# Patient Record
Sex: FEMALE | Race: WHITE | Employment: FULL TIME | URBAN - METROPOLITAN AREA
[De-identification: names, ages, dates, MRNs, and addresses within clinical notes are randomized per-mention and may not be internally consistent; named-entity substitution may affect disease eponyms.]

---

## 2017-01-03 DIAGNOSIS — D64.9 ANEMIA, UNSPECIFIED: Primary | ICD-10-CM

## 2017-01-03 DIAGNOSIS — E66.09 OBESITY DUE TO EXCESS CALORIES, UNSPECIFIED OBESITY SEVERITY: ICD-10-CM

## 2017-01-03 DIAGNOSIS — E78.00 PURE HYPERCHOLESTEROLEMIA: ICD-10-CM

## 2017-01-03 DIAGNOSIS — I10 ESSENTIAL HYPERTENSION, BENIGN: ICD-10-CM

## 2017-01-04 LAB
ABSOLUTE BASOPHILS: 28 CELLS/UL (ref 0–200)
ABSOLUTE EOSINOPHILS: 270 CELLS/UL (ref 15–500)
ABSOLUTE LYMPHOCYTES: 1767 CELLS/UL (ref 850–3900)
ABSOLUTE MONOCYTES: 605 CELLS/UL (ref 200–950)
ABSOLUTE NEUTROPHILS: 6631 CELLS/UL (ref 1500–7800)
ALBUMIN/GLOBULIN RATIO: 1.5 (CALC) (ref 1–2.5)
ALBUMIN: 4.1 G/DL (ref 3.6–5.1)
ALKALINE PHOSPHATASE: 85 U/L (ref 33–130)
ALT: 31 U/L (ref 6–29)
AST: 24 U/L (ref 10–35)
BASOPHILS: 0.3 %
BILIRUBIN, TOTAL: 0.3 MG/DL (ref 0.2–1.2)
BUN: 15 MG/DL (ref 7–25)
CALCIUM: 9.6 MG/DL (ref 8.6–10.4)
CARBON DIOXIDE: 28 MMOL/L (ref 20–31)
CHLORIDE: 104 MMOL/L (ref 98–110)
CHOL/HDLC RATIO: 3.4 (CALC)
CHOLESTEROL, TOTAL: 199 MG/DL (ref 125–200)
CREATININE: 0.72 MG/DL (ref 0.5–1.05)
EGFR IF AFRICN AM: 112 ML/MIN/1.73M2
EGFR IF NONAFRICN AM: 96 ML/MIN/1.73M2
EOSINOPHILS: 2.9 %
GLOBULIN: 2.8 G/DL (CALC) (ref 1.9–3.7)
GLUCOSE: 103 MG/DL (ref 65–99)
HDL CHOLESTEROL: 59 MG/DL
HEMATOCRIT: 37.9 % (ref 35–45)
HEMOGLOBIN A1C: 5.9 % OF TOTAL HGB
HEMOGLOBIN: 12.6 G/DL (ref 11.7–15.5)
LDL-CHOLESTEROL: 115 MG/DL (CALC)
LYMPHOCYTES: 19 %
MCH: 28.8 PG (ref 27–33)
MCHC: 33.3 G/DL (ref 32–36)
MCV: 86.4 FL (ref 80–100)
MONOCYTES: 6.5 %
MPV: 8.3 FL (ref 7.5–11.5)
NEUTROPHILS: 71.3 %
NON-HDL CHOLESTEROL: 140 MG/DL (CALC)
PLATELET COUNT: 334 THOUSAND/UL (ref 140–400)
POTASSIUM: 4.6 MMOL/L (ref 3.5–5.3)
PROTEIN, TOTAL: 6.9 G/DL (ref 6.1–8.1)
RDW: 14.3 % (ref 11–15)
RED BLOOD CELL COUNT: 4.39 MILLION/UL (ref 3.8–5.1)
SODIUM: 139 MMOL/L (ref 135–146)
TRIGLYCERIDES: 126 MG/DL
TSH: 2.47 MIU/L
VITAMIN D, 25-OH, TOTAL: 23 NG/ML (ref 30–100)
WHITE BLOOD CELL COUNT: 9.3 THOUSAND/UL (ref 3.8–10.8)

## 2017-01-06 RX ORDER — ERGOCALCIFEROL (VITAMIN D2) 1250 MCG
50000 CAPSULE ORAL WEEKLY
Qty: 24 CAPSULE | Refills: 0 | Status: SHIPPED | OUTPATIENT
Start: 2017-01-06 | End: 2017-02-05

## 2017-02-16 RX ORDER — ESCITALOPRAM OXALATE 20 MG/1
TABLET ORAL
Qty: 90 TABLET | Refills: 0 | Status: SHIPPED | OUTPATIENT
Start: 2017-02-16 | End: 2017-03-30

## 2017-02-24 ENCOUNTER — OFFICE VISIT (OUTPATIENT)
Dept: INTERNAL MEDICINE CLINIC | Facility: CLINIC | Age: 52
End: 2017-02-24

## 2017-02-24 VITALS
DIASTOLIC BLOOD PRESSURE: 78 MMHG | SYSTOLIC BLOOD PRESSURE: 120 MMHG | WEIGHT: 257.25 LBS | HEART RATE: 68 BPM | BODY MASS INDEX: 43 KG/M2 | OXYGEN SATURATION: 97 % | TEMPERATURE: 99 F

## 2017-02-24 DIAGNOSIS — J06.9 ACUTE UPPER RESPIRATORY INFECTION: Primary | ICD-10-CM

## 2017-02-24 PROCEDURE — 99213 OFFICE O/P EST LOW 20 MIN: CPT | Performed by: INTERNAL MEDICINE

## 2017-02-24 RX ORDER — AMOXICILLIN AND CLAVULANATE POTASSIUM 875; 125 MG/1; MG/1
1 TABLET, FILM COATED ORAL 2 TIMES DAILY
Qty: 14 TABLET | Refills: 0 | Status: SHIPPED | OUTPATIENT
Start: 2017-02-24 | End: 2017-03-03

## 2017-02-24 RX ORDER — FLUTICASONE PROPIONATE 50 MCG
2 SPRAY, SUSPENSION (ML) NASAL DAILY
Qty: 1 BOTTLE | Refills: 3 | Status: SHIPPED | OUTPATIENT
Start: 2017-02-24 | End: 2018-02-19

## 2017-02-24 NOTE — PATIENT INSTRUCTIONS
For your sinus infection:  - We will do a course of antibiotics (Augmentin). Take 1 tablet twice daily with meals for the next week. Take over the counter probiotics or yogurt while on the antibiotic.   The main side effect to look out for is GI upset/cayla

## 2017-02-24 NOTE — PROGRESS NOTES
Naga Marino is a 45year old female. HPI:   Patient presents with:  URI: over 1 week. The last couple days with sore throat. Green mucus. Patient presents with acute upper respiratory symptoms.   She has been having a sore throat, congestion, before breakfast., Disp: 90 tablet, Rfl: 0  •  ferrous sulfate 325 (65 FE) MG Oral Tab EC, Take 325 mg by mouth daily with breakfast., Disp: , Rfl:     Medical:  has a past medical history of Lipid screening (9/14/2011);  Closed fracture of unspecified part sputum. Augmentin 875 BID x 7 days prescribed, as well as fluticasone nasal spray BID. Advised to call with worsening symptoms.        Patient Care Team:  Bambi Gomez MD as PCP - [facility])  The patient indicates understanding of these i

## 2017-03-30 ENCOUNTER — TELEPHONE (OUTPATIENT)
Dept: INTERNAL MEDICINE CLINIC | Facility: CLINIC | Age: 52
End: 2017-03-30

## 2017-03-30 RX ORDER — ESCITALOPRAM OXALATE 20 MG/1
20 TABLET ORAL
Qty: 90 TABLET | Refills: 0 | Status: SHIPPED | OUTPATIENT
Start: 2017-03-30 | End: 2017-07-05

## 2017-03-30 RX ORDER — ATORVASTATIN CALCIUM 10 MG/1
TABLET, FILM COATED ORAL
Qty: 90 TABLET | Refills: 0 | Status: SHIPPED | OUTPATIENT
Start: 2017-03-30 | End: 2017-07-05

## 2017-03-30 RX ORDER — METOPROLOL TARTRATE 50 MG/1
50 TABLET, FILM COATED ORAL 2 TIMES DAILY
Qty: 180 TABLET | Refills: 0 | Status: SHIPPED | OUTPATIENT
Start: 2017-03-30 | End: 2017-07-05

## 2017-06-16 DIAGNOSIS — R89.9 ABNORMAL LABORATORY TEST: Primary | ICD-10-CM

## 2017-07-05 ENCOUNTER — LAB ENCOUNTER (OUTPATIENT)
Dept: LAB | Age: 52
End: 2017-07-05
Attending: FAMILY MEDICINE
Payer: COMMERCIAL

## 2017-07-05 ENCOUNTER — OFFICE VISIT (OUTPATIENT)
Dept: INTERNAL MEDICINE CLINIC | Facility: CLINIC | Age: 52
End: 2017-07-05

## 2017-07-05 VITALS
BODY MASS INDEX: 42.65 KG/M2 | HEIGHT: 65 IN | WEIGHT: 256 LBS | HEART RATE: 66 BPM | TEMPERATURE: 98 F | SYSTOLIC BLOOD PRESSURE: 126 MMHG | DIASTOLIC BLOOD PRESSURE: 78 MMHG | OXYGEN SATURATION: 96 % | RESPIRATION RATE: 16 BRPM

## 2017-07-05 DIAGNOSIS — F41.9 ANXIETY: ICD-10-CM

## 2017-07-05 DIAGNOSIS — M25.571 CHRONIC PAIN OF RIGHT ANKLE: ICD-10-CM

## 2017-07-05 DIAGNOSIS — D64.9 ANEMIA, UNSPECIFIED: ICD-10-CM

## 2017-07-05 DIAGNOSIS — E78.00 PURE HYPERCHOLESTEROLEMIA: ICD-10-CM

## 2017-07-05 DIAGNOSIS — E55.9 VITAMIN D DEFICIENCY: ICD-10-CM

## 2017-07-05 DIAGNOSIS — I10 ESSENTIAL HYPERTENSION, BENIGN: Primary | ICD-10-CM

## 2017-07-05 DIAGNOSIS — G89.29 CHRONIC PAIN OF RIGHT ANKLE: ICD-10-CM

## 2017-07-05 DIAGNOSIS — F34.1 DYSTHYMIA: ICD-10-CM

## 2017-07-05 DIAGNOSIS — R89.9 ABNORMAL LABORATORY TEST: ICD-10-CM

## 2017-07-05 LAB
25-HYDROXYVITAMIN D (TOTAL): 42.8 NG/ML (ref 30–100)
ALBUMIN SERPL-MCNC: 3.5 G/DL (ref 3.5–4.8)
ALP LIVER SERPL-CCNC: 98 U/L (ref 41–108)
ALT SERPL-CCNC: 42 U/L (ref 14–54)
AST SERPL-CCNC: 39 U/L (ref 15–41)
BASOPHILS # BLD AUTO: 0.04 X10(3) UL (ref 0–0.1)
BASOPHILS NFR BLD AUTO: 0.5 %
BILIRUB SERPL-MCNC: 0.5 MG/DL (ref 0.1–2)
BUN BLD-MCNC: 14 MG/DL (ref 8–20)
CALCIUM BLD-MCNC: 9.1 MG/DL (ref 8.3–10.3)
CHLORIDE: 112 MMOL/L (ref 101–111)
CHOLEST SMN-MCNC: 191 MG/DL (ref ?–200)
CO2: 24 MMOL/L (ref 22–32)
CREAT BLD-MCNC: 0.84 MG/DL (ref 0.55–1.02)
EOSINOPHIL # BLD AUTO: 0.27 X10(3) UL (ref 0–0.3)
EOSINOPHIL NFR BLD AUTO: 3.5 %
ERYTHROCYTE [DISTWIDTH] IN BLOOD BY AUTOMATED COUNT: 13.4 % (ref 11.5–16)
EST. AVERAGE GLUCOSE BLD GHB EST-MCNC: 128 MG/DL (ref 68–126)
GLUCOSE BLD-MCNC: 87 MG/DL (ref 70–99)
HBA1C MFR BLD HPLC: 6.1 % (ref ?–5.7)
HCT VFR BLD AUTO: 41.5 % (ref 34–50)
HDLC SERPL-MCNC: 60 MG/DL (ref 45–?)
HDLC SERPL: 3.18 {RATIO} (ref ?–4.44)
HGB BLD-MCNC: 13.3 G/DL (ref 12–16)
IMMATURE GRANULOCYTE COUNT: 0.02 X10(3) UL (ref 0–1)
IMMATURE GRANULOCYTE RATIO %: 0.3 %
LDLC SERPL CALC-MCNC: 112 MG/DL (ref ?–130)
LYMPHOCYTES # BLD AUTO: 1.85 X10(3) UL (ref 0.9–4)
LYMPHOCYTES NFR BLD AUTO: 23.9 %
M PROTEIN MFR SERPL ELPH: 7.9 G/DL (ref 6.1–8.3)
MCH RBC QN AUTO: 28.1 PG (ref 27–33.2)
MCHC RBC AUTO-ENTMCNC: 32 G/DL (ref 31–37)
MCV RBC AUTO: 87.7 FL (ref 81–100)
MONOCYTES # BLD AUTO: 0.6 X10(3) UL (ref 0.1–0.6)
MONOCYTES NFR BLD AUTO: 7.7 %
NEUTROPHIL ABS PRELIM: 4.97 X10 (3) UL (ref 1.3–6.7)
NEUTROPHILS # BLD AUTO: 4.97 X10(3) UL (ref 1.3–6.7)
NEUTROPHILS NFR BLD AUTO: 64.1 %
NONHDLC SERPL-MCNC: 131 MG/DL (ref ?–130)
POTASSIUM SERPL-SCNC: 4.9 MMOL/L (ref 3.6–5.1)
RBC # BLD AUTO: 4.73 X10(6)UL (ref 3.8–5.1)
RED CELL DISTRIBUTION WIDTH-SD: 43 FL (ref 35.1–46.3)
SODIUM SERPL-SCNC: 144 MMOL/L (ref 136–144)
TRIGLYCERIDES: 94 MG/DL (ref ?–150)
VLDL: 19 MG/DL (ref 5–40)
WBC # BLD AUTO: 7.8 X10(3) UL (ref 4–13)

## 2017-07-05 PROCEDURE — 80053 COMPREHEN METABOLIC PANEL: CPT

## 2017-07-05 PROCEDURE — 36415 COLL VENOUS BLD VENIPUNCTURE: CPT

## 2017-07-05 PROCEDURE — 82306 VITAMIN D 25 HYDROXY: CPT

## 2017-07-05 PROCEDURE — 85025 COMPLETE CBC W/AUTO DIFF WBC: CPT

## 2017-07-05 PROCEDURE — 99214 OFFICE O/P EST MOD 30 MIN: CPT | Performed by: FAMILY MEDICINE

## 2017-07-05 PROCEDURE — 80061 LIPID PANEL: CPT

## 2017-07-05 PROCEDURE — 83036 HEMOGLOBIN GLYCOSYLATED A1C: CPT

## 2017-07-05 RX ORDER — ESCITALOPRAM OXALATE 20 MG/1
20 TABLET ORAL
Qty: 90 TABLET | Refills: 0 | Status: SHIPPED | OUTPATIENT
Start: 2017-07-05 | End: 2017-10-01

## 2017-07-05 RX ORDER — HYDROCODONE BITARTRATE AND ACETAMINOPHEN 5; 325 MG/1; MG/1
1 TABLET ORAL EVERY 6 HOURS PRN
Qty: 30 TABLET | Refills: 0 | Status: SHIPPED | OUTPATIENT
Start: 2017-07-05 | End: 2018-01-31

## 2017-07-05 RX ORDER — METOPROLOL TARTRATE 50 MG/1
50 TABLET, FILM COATED ORAL 2 TIMES DAILY
Qty: 180 TABLET | Refills: 0 | Status: SHIPPED | OUTPATIENT
Start: 2017-07-05 | End: 2017-10-01

## 2017-07-05 RX ORDER — ATORVASTATIN CALCIUM 10 MG/1
TABLET, FILM COATED ORAL
Qty: 90 TABLET | Refills: 0 | Status: SHIPPED | OUTPATIENT
Start: 2017-07-05 | End: 2017-10-01

## 2017-07-05 RX ORDER — ALPRAZOLAM 0.5 MG/1
0.5 TABLET ORAL 2 TIMES DAILY PRN
Qty: 90 TABLET | Refills: 0 | Status: SHIPPED | OUTPATIENT
Start: 2017-07-05 | End: 2018-01-01

## 2017-07-05 NOTE — PROGRESS NOTES
CHIEF COMPLAINT:     Patient presents with: Follow - Up: Medication follow up      HPI:   Radha Morales is a 45year old female   Patient presents for recheck of  Hypertension and hyperlipdemia.  Pt has been taking medications as instructed, no medica MG Oral Tab TAKE 1 TABLET BY MOUTH EVERY NIGHT AT BEDTIME Disp: 90 tablet Rfl: 0   Metoprolol Tartrate 50 MG Oral Tab Take 1 tablet (50 mg total) by mouth 2 (two) times daily.  Disp: 180 tablet Rfl: 0   Fluticasone Propionate 50 MCG/ACT Nasal Suspension 2 s suspicious lesions  HEAD: atraumatic, normocephalic  EYES: conjunctiva clear, EOM intact, PERRLA  HEENT: ears,nose and throat clear  NECK: supple, non-tender  LUNGS: clear to auscultation bilaterally, no wheezes or rhonchi. Breathing is non labored.   CARDI today, await result. 7. Chronic pain of right ankle  Stable. Pt only uses the medication as needed. - HYDROcodone-acetaminophen ([address]) 5-325 MG Oral Tab; Take 1 tablet by mouth every 6 (six) hours as needed for Pain. Dispense: 30 tablet;  Refill: 0

## 2017-10-01 DIAGNOSIS — F41.9 ANXIETY: ICD-10-CM

## 2017-10-01 DIAGNOSIS — E78.00 PURE HYPERCHOLESTEROLEMIA: ICD-10-CM

## 2017-10-01 DIAGNOSIS — I10 ESSENTIAL HYPERTENSION, BENIGN: ICD-10-CM

## 2017-10-01 DIAGNOSIS — F34.1 DYSTHYMIA: ICD-10-CM

## 2017-10-03 RX ORDER — ESCITALOPRAM OXALATE 20 MG/1
20 TABLET ORAL
Qty: 90 TABLET | Refills: 0 | Status: SHIPPED | OUTPATIENT
Start: 2017-10-03 | End: 2018-01-31

## 2017-10-03 RX ORDER — ATORVASTATIN CALCIUM 10 MG/1
TABLET, FILM COATED ORAL
Qty: 90 TABLET | Refills: 0 | Status: SHIPPED | OUTPATIENT
Start: 2017-10-03 | End: 2018-01-04

## 2017-10-03 RX ORDER — METOPROLOL TARTRATE 50 MG/1
50 TABLET, FILM COATED ORAL 2 TIMES DAILY
Qty: 180 TABLET | Refills: 0 | Status: SHIPPED | OUTPATIENT
Start: 2017-10-03 | End: 2018-01-31

## 2018-01-01 DIAGNOSIS — E55.9 VITAMIN D DEFICIENCY: ICD-10-CM

## 2018-01-01 DIAGNOSIS — F41.9 ANXIETY: ICD-10-CM

## 2018-01-01 DIAGNOSIS — F34.1 DYSTHYMIA: ICD-10-CM

## 2018-01-02 RX ORDER — ALPRAZOLAM 0.5 MG/1
TABLET ORAL
Qty: 30 TABLET | Refills: 0 | Status: SHIPPED | OUTPATIENT
Start: 2018-01-02 | End: 2018-08-28

## 2018-01-04 DIAGNOSIS — E78.00 PURE HYPERCHOLESTEROLEMIA: ICD-10-CM

## 2018-01-04 DIAGNOSIS — F41.9 ANXIETY: ICD-10-CM

## 2018-01-04 DIAGNOSIS — F34.1 DYSTHYMIA: ICD-10-CM

## 2018-01-04 DIAGNOSIS — I10 ESSENTIAL HYPERTENSION, BENIGN: ICD-10-CM

## 2018-01-06 RX ORDER — METOPROLOL TARTRATE 50 MG/1
TABLET, FILM COATED ORAL
Qty: 180 TABLET | Refills: 0 | OUTPATIENT
Start: 2018-01-06

## 2018-01-06 RX ORDER — ATORVASTATIN CALCIUM 10 MG/1
TABLET, FILM COATED ORAL
Qty: 90 TABLET | Refills: 0 | Status: SHIPPED | OUTPATIENT
Start: 2018-01-06 | End: 2018-04-09

## 2018-01-06 RX ORDER — ESCITALOPRAM OXALATE 20 MG/1
20 TABLET ORAL
Qty: 90 TABLET | Refills: 0 | OUTPATIENT
Start: 2018-01-06

## 2018-01-27 ENCOUNTER — HOSPITAL ENCOUNTER (OUTPATIENT)
Age: 53
Discharge: HOME | End: 2018-01-27
Attending: FAMILY MEDICINE
Payer: COMMERCIAL

## 2018-01-27 VITALS
HEART RATE: 81 BPM | OXYGEN SATURATION: 95 % | TEMPERATURE: 98 F | SYSTOLIC BLOOD PRESSURE: 153 MMHG | RESPIRATION RATE: 20 BRPM | DIASTOLIC BLOOD PRESSURE: 68 MMHG

## 2018-01-27 DIAGNOSIS — B34.9 VIRAL SYNDROME: ICD-10-CM

## 2018-01-27 DIAGNOSIS — J01.00 ACUTE MAXILLARY SINUSITIS, RECURRENCE NOT SPECIFIED: Primary | ICD-10-CM

## 2018-01-27 DIAGNOSIS — K52.9 GASTROENTERITIS: ICD-10-CM

## 2018-01-27 PROCEDURE — 99213 OFFICE O/P EST LOW 20 MIN: CPT

## 2018-01-27 PROCEDURE — 99214 OFFICE O/P EST MOD 30 MIN: CPT

## 2018-01-27 RX ORDER — IBUPROFEN 600 MG/1
600 TABLET ORAL EVERY 6 HOURS PRN
COMMUNITY
End: 2020-12-19 | Stop reason: ALTCHOICE

## 2018-01-27 RX ORDER — ONDANSETRON 4 MG/1
4 TABLET, FILM COATED ORAL EVERY 6 HOURS PRN
Qty: 12 TABLET | Refills: 0 | Status: SHIPPED | OUTPATIENT
Start: 2018-01-27 | End: 2018-01-30

## 2018-01-27 NOTE — ED PROVIDER NOTES
Patient Seen in: THE [facility]    History   Patient presents with:  Cough/URI  Nausea  Diarrhea  Fever (infectious)  Flu    Stated Complaint: FLU SYMPTOMS X 5 DAYS    HPI  50-year-old female coming in with complains of cough, conge Exam   ED Triage Vitals [01/27/18 1047]  BP: 153/68  Pulse: 81  Resp: 20  Temp: 98.4 °F (36.9 °C)  Temp src: Temporal  SpO2: 95 %  O2 Device: None (Room air)    Current:/68   Pulse 81   Temp 98.4 °F (36.9 °C) (Temporal)   Resp 20   LMP 09/13/2017   S nasal septal mucosal dryness and nosebleeds discussed - to avoid / reduce the risk of this use fragrance free vaseline to apply over the nasal septal mucosa)  · Daily antihistamine - Claritin OR Zyrtec in the AM (non-drowsy) and take Mucinex cold at night

## 2018-01-31 ENCOUNTER — OFFICE VISIT (OUTPATIENT)
Dept: INTERNAL MEDICINE CLINIC | Facility: CLINIC | Age: 53
End: 2018-01-31

## 2018-01-31 VITALS
RESPIRATION RATE: 16 BRPM | OXYGEN SATURATION: 97 % | BODY MASS INDEX: 43 KG/M2 | SYSTOLIC BLOOD PRESSURE: 142 MMHG | DIASTOLIC BLOOD PRESSURE: 96 MMHG | HEART RATE: 74 BPM | TEMPERATURE: 98 F | WEIGHT: 255.5 LBS

## 2018-01-31 DIAGNOSIS — R73.01 IMPAIRED FASTING GLUCOSE: ICD-10-CM

## 2018-01-31 DIAGNOSIS — G89.29 CHRONIC PAIN OF RIGHT ANKLE: ICD-10-CM

## 2018-01-31 DIAGNOSIS — F34.1 DYSTHYMIA: ICD-10-CM

## 2018-01-31 DIAGNOSIS — J01.00 ACUTE NON-RECURRENT MAXILLARY SINUSITIS: ICD-10-CM

## 2018-01-31 DIAGNOSIS — I10 ESSENTIAL HYPERTENSION, BENIGN: Primary | ICD-10-CM

## 2018-01-31 DIAGNOSIS — M25.571 CHRONIC PAIN OF RIGHT ANKLE: ICD-10-CM

## 2018-01-31 DIAGNOSIS — E55.9 VITAMIN D DEFICIENCY: ICD-10-CM

## 2018-01-31 DIAGNOSIS — D64.9 ANEMIA, UNSPECIFIED TYPE: ICD-10-CM

## 2018-01-31 DIAGNOSIS — R22.9 SKIN NODULE: ICD-10-CM

## 2018-01-31 DIAGNOSIS — H66.92 OTITIS OF LEFT EAR: ICD-10-CM

## 2018-01-31 DIAGNOSIS — F41.9 ANXIETY: ICD-10-CM

## 2018-01-31 DIAGNOSIS — E78.00 PURE HYPERCHOLESTEROLEMIA: ICD-10-CM

## 2018-01-31 PROCEDURE — 99214 OFFICE O/P EST MOD 30 MIN: CPT | Performed by: NURSE PRACTITIONER

## 2018-01-31 RX ORDER — AMOXICILLIN 875 MG/1
875 TABLET, COATED ORAL 2 TIMES DAILY
Qty: 14 TABLET | Refills: 0 | Status: SHIPPED | OUTPATIENT
Start: 2018-01-31 | End: 2018-08-28 | Stop reason: ALTCHOICE

## 2018-01-31 RX ORDER — HYDROCODONE BITARTRATE AND ACETAMINOPHEN 5; 325 MG/1; MG/1
1 TABLET ORAL EVERY 6 HOURS PRN
Qty: 30 TABLET | Refills: 0 | Status: SHIPPED | OUTPATIENT
Start: 2018-01-31 | End: 2018-08-28

## 2018-01-31 RX ORDER — ESCITALOPRAM OXALATE 20 MG/1
20 TABLET ORAL
Qty: 90 TABLET | Refills: 0 | Status: SHIPPED | OUTPATIENT
Start: 2018-01-31 | End: 2018-05-02

## 2018-01-31 RX ORDER — METOPROLOL TARTRATE 50 MG/1
50 TABLET, FILM COATED ORAL 2 TIMES DAILY
Qty: 180 TABLET | Refills: 0 | Status: SHIPPED | OUTPATIENT
Start: 2018-01-31 | End: 2018-05-02

## 2018-01-31 RX ORDER — MELATONIN
325
Qty: 90 TABLET | Refills: 1 | Status: SHIPPED | OUTPATIENT
Start: 2018-01-31 | End: 2019-04-11

## 2018-01-31 NOTE — PROGRESS NOTES
CHIEF COMPLAINT:     Patient presents with:  Blood Pressure: Follow up. Urgent Care F/u: 1/27/18 dt URI (cough, fever, nausea, diarrhea, congestion, sore throat). Using Claritin 24-hour, Mucinex, and Ibuprofen.        HPI:   Naga Marino is a 47 ye major fracture with pin placement followed by several other surgeries. She only uses the Norco 1/2 tab as needed, maybe once a week. She has also noted in the past few months a growth on the bottom of her left foot.  The area is painful and she notices it e unspecified hyperlipidemia    • Unspecified essential hypertension       Social History:  Smoking status: Never Smoker                                                              Smokeless tobacco: Never Used                      Alcohol use:  No limited ROM to right ankle due to fusion  LYMPH:  No lymphadenopathy. Physical Exam    ASSESSMENT AND PLAN:     1. Essential hypertension, benign  - COMP METABOLIC PANEL (14); Future  - Metoprolol Tartrate 50 MG Oral Tab;  Take 1 tablet (50 mg total) b

## 2018-04-06 ENCOUNTER — LAB ENCOUNTER (OUTPATIENT)
Dept: LAB | Age: 53
End: 2018-04-06
Attending: NURSE PRACTITIONER
Payer: COMMERCIAL

## 2018-04-06 DIAGNOSIS — E78.00 PURE HYPERCHOLESTEROLEMIA: ICD-10-CM

## 2018-04-06 DIAGNOSIS — I10 ESSENTIAL HYPERTENSION, BENIGN: ICD-10-CM

## 2018-04-06 DIAGNOSIS — E55.9 VITAMIN D DEFICIENCY: ICD-10-CM

## 2018-04-06 DIAGNOSIS — D64.9 ANEMIA, UNSPECIFIED TYPE: ICD-10-CM

## 2018-04-06 DIAGNOSIS — R73.01 IMPAIRED FASTING GLUCOSE: ICD-10-CM

## 2018-04-06 PROCEDURE — 82306 VITAMIN D 25 HYDROXY: CPT | Performed by: NURSE PRACTITIONER

## 2018-04-06 PROCEDURE — 83036 HEMOGLOBIN GLYCOSYLATED A1C: CPT | Performed by: NURSE PRACTITIONER

## 2018-04-06 PROCEDURE — 80053 COMPREHEN METABOLIC PANEL: CPT | Performed by: NURSE PRACTITIONER

## 2018-04-06 PROCEDURE — 36415 COLL VENOUS BLD VENIPUNCTURE: CPT | Performed by: NURSE PRACTITIONER

## 2018-04-06 PROCEDURE — 80061 LIPID PANEL: CPT | Performed by: NURSE PRACTITIONER

## 2018-04-06 PROCEDURE — 85025 COMPLETE CBC W/AUTO DIFF WBC: CPT | Performed by: NURSE PRACTITIONER

## 2018-04-09 DIAGNOSIS — E55.9 VITAMIN D DEFICIENCY: Primary | ICD-10-CM

## 2018-04-09 DIAGNOSIS — E78.00 PURE HYPERCHOLESTEROLEMIA: ICD-10-CM

## 2018-04-09 RX ORDER — ERGOCALCIFEROL 1.25 MG/1
50000 CAPSULE ORAL WEEKLY
Qty: 12 CAPSULE | Refills: 0 | Status: SHIPPED | OUTPATIENT
Start: 2018-04-09 | End: 2018-08-28 | Stop reason: ALTCHOICE

## 2018-04-09 RX ORDER — ATORVASTATIN CALCIUM 10 MG/1
TABLET, FILM COATED ORAL
Qty: 90 TABLET | Refills: 1 | Status: SHIPPED | OUTPATIENT
Start: 2018-04-09 | End: 2018-08-28

## 2018-05-02 ENCOUNTER — TELEPHONE (OUTPATIENT)
Dept: INTERNAL MEDICINE CLINIC | Facility: CLINIC | Age: 53
End: 2018-05-02

## 2018-05-02 DIAGNOSIS — F41.9 ANXIETY: ICD-10-CM

## 2018-05-02 DIAGNOSIS — F34.1 DYSTHYMIA: ICD-10-CM

## 2018-05-02 DIAGNOSIS — I10 ESSENTIAL HYPERTENSION, BENIGN: ICD-10-CM

## 2018-05-02 RX ORDER — METOPROLOL TARTRATE 50 MG/1
50 TABLET, FILM COATED ORAL 2 TIMES DAILY
Qty: 60 TABLET | Refills: 0 | Status: SHIPPED | OUTPATIENT
Start: 2018-05-02 | End: 2018-06-07

## 2018-05-02 RX ORDER — ESCITALOPRAM OXALATE 20 MG/1
20 TABLET ORAL DAILY
Qty: 30 TABLET | Refills: 0 | Status: SHIPPED | OUTPATIENT
Start: 2018-05-02 | End: 2018-06-27

## 2018-06-07 DIAGNOSIS — I10 ESSENTIAL HYPERTENSION, BENIGN: ICD-10-CM

## 2018-06-09 RX ORDER — METOPROLOL TARTRATE 50 MG/1
TABLET, FILM COATED ORAL
Qty: 60 TABLET | Refills: 1 | Status: SHIPPED | OUTPATIENT
Start: 2018-06-09 | End: 2018-08-06

## 2018-06-27 ENCOUNTER — TELEPHONE (OUTPATIENT)
Dept: INTERNAL MEDICINE CLINIC | Facility: CLINIC | Age: 53
End: 2018-06-27

## 2018-06-27 DIAGNOSIS — F41.9 ANXIETY: ICD-10-CM

## 2018-06-27 DIAGNOSIS — F34.1 DYSTHYMIA: ICD-10-CM

## 2018-06-27 RX ORDER — ESCITALOPRAM OXALATE 20 MG/1
20 TABLET ORAL DAILY
Qty: 30 TABLET | Refills: 1 | Status: SHIPPED | OUTPATIENT
Start: 2018-06-27 | End: 2018-08-23

## 2018-08-06 DIAGNOSIS — I10 ESSENTIAL HYPERTENSION, BENIGN: ICD-10-CM

## 2018-08-06 RX ORDER — METOPROLOL TARTRATE 50 MG/1
50 TABLET, FILM COATED ORAL 2 TIMES DAILY
Qty: 60 TABLET | Refills: 0 | Status: SHIPPED | OUTPATIENT
Start: 2018-08-06 | End: 2018-08-13

## 2018-08-09 ENCOUNTER — TELEPHONE (OUTPATIENT)
Dept: INTERNAL MEDICINE CLINIC | Facility: CLINIC | Age: 53
End: 2018-08-09

## 2018-08-09 DIAGNOSIS — I10 ESSENTIAL HYPERTENSION, BENIGN: ICD-10-CM

## 2018-08-09 NOTE — TELEPHONE ENCOUNTER
Left message on patient's voicemail stating to call our office to schedule appointment so we can refill BP med (Metoprolol Tartrate 50mg) for #90 to [facility].

## 2018-08-09 NOTE — TELEPHONE ENCOUNTER
[facility] called to check on status of this refill [facility]days; informed message left from MA today for patient needing to call for appointment

## 2018-08-13 RX ORDER — METOPROLOL TARTRATE 50 MG/1
50 TABLET, FILM COATED ORAL 2 TIMES DAILY
Qty: 180 TABLET | Refills: 0 | Status: SHIPPED | OUTPATIENT
Start: 2018-08-13 | End: 2018-08-28

## 2018-08-23 DIAGNOSIS — F41.9 ANXIETY: ICD-10-CM

## 2018-08-23 DIAGNOSIS — F34.1 DYSTHYMIA: ICD-10-CM

## 2018-08-23 RX ORDER — ESCITALOPRAM OXALATE 20 MG/1
20 TABLET ORAL DAILY
Qty: 15 TABLET | Refills: 0 | Status: SHIPPED | OUTPATIENT
Start: 2018-08-23 | End: 2018-08-28

## 2018-08-28 ENCOUNTER — TELEPHONE (OUTPATIENT)
Dept: INTERNAL MEDICINE CLINIC | Facility: CLINIC | Age: 53
End: 2018-08-28

## 2018-08-28 ENCOUNTER — OFFICE VISIT (OUTPATIENT)
Dept: INTERNAL MEDICINE CLINIC | Facility: CLINIC | Age: 53
End: 2018-08-28
Payer: COMMERCIAL

## 2018-08-28 ENCOUNTER — LAB ENCOUNTER (OUTPATIENT)
Dept: LAB | Age: 53
End: 2018-08-28
Attending: FAMILY MEDICINE
Payer: COMMERCIAL

## 2018-08-28 VITALS
HEIGHT: 65 IN | DIASTOLIC BLOOD PRESSURE: 70 MMHG | SYSTOLIC BLOOD PRESSURE: 106 MMHG | RESPIRATION RATE: 16 BRPM | BODY MASS INDEX: 43.32 KG/M2 | OXYGEN SATURATION: 99 % | WEIGHT: 260 LBS | HEART RATE: 66 BPM

## 2018-08-28 DIAGNOSIS — F41.9 ANXIETY: ICD-10-CM

## 2018-08-28 DIAGNOSIS — M25.571 CHRONIC PAIN OF RIGHT ANKLE: ICD-10-CM

## 2018-08-28 DIAGNOSIS — I10 ESSENTIAL HYPERTENSION, BENIGN: ICD-10-CM

## 2018-08-28 DIAGNOSIS — R06.83 SNORING: ICD-10-CM

## 2018-08-28 DIAGNOSIS — E78.00 PURE HYPERCHOLESTEROLEMIA: ICD-10-CM

## 2018-08-28 DIAGNOSIS — D50.9 IRON DEFICIENCY ANEMIA, UNSPECIFIED IRON DEFICIENCY ANEMIA TYPE: ICD-10-CM

## 2018-08-28 DIAGNOSIS — R73.01 IMPAIRED FASTING GLUCOSE: ICD-10-CM

## 2018-08-28 DIAGNOSIS — Z11.3 SCREENING FOR STD (SEXUALLY TRANSMITTED DISEASE): ICD-10-CM

## 2018-08-28 DIAGNOSIS — E55.9 VITAMIN D DEFICIENCY: ICD-10-CM

## 2018-08-28 DIAGNOSIS — F34.1 DYSTHYMIA: ICD-10-CM

## 2018-08-28 DIAGNOSIS — G89.29 CHRONIC PAIN OF RIGHT ANKLE: ICD-10-CM

## 2018-08-28 DIAGNOSIS — D50.9 IRON DEFICIENCY ANEMIA, UNSPECIFIED IRON DEFICIENCY ANEMIA TYPE: Primary | ICD-10-CM

## 2018-08-28 DIAGNOSIS — Z12.31 ENCOUNTER FOR SCREENING MAMMOGRAM FOR MALIGNANT NEOPLASM OF BREAST: ICD-10-CM

## 2018-08-28 LAB
ALBUMIN SERPL-MCNC: 3.6 G/DL (ref 3.5–4.8)
ALBUMIN/GLOB SERPL: 0.8 {RATIO} (ref 1–2)
ALP LIVER SERPL-CCNC: 95 U/L (ref 41–108)
ALT SERPL-CCNC: 45 U/L (ref 14–54)
ANION GAP SERPL CALC-SCNC: 7 MMOL/L (ref 0–18)
AST SERPL-CCNC: 36 U/L (ref 15–41)
BASOPHILS # BLD AUTO: 0.04 X10(3) UL (ref 0–0.1)
BASOPHILS NFR BLD AUTO: 0.4 %
BILIRUB SERPL-MCNC: 0.3 MG/DL (ref 0.1–2)
BUN BLD-MCNC: 21 MG/DL (ref 8–20)
BUN/CREAT SERPL: 28.8 (ref 10–20)
CALCIUM BLD-MCNC: 9.3 MG/DL (ref 8.3–10.3)
CHLORIDE SERPL-SCNC: 106 MMOL/L (ref 101–111)
CHOLEST SMN-MCNC: 202 MG/DL (ref ?–200)
CO2 SERPL-SCNC: 27 MMOL/L (ref 22–32)
CREAT BLD-MCNC: 0.73 MG/DL (ref 0.55–1.02)
EOSINOPHIL # BLD AUTO: 0.3 X10(3) UL (ref 0–0.3)
EOSINOPHIL NFR BLD AUTO: 3.2 %
ERYTHROCYTE [DISTWIDTH] IN BLOOD BY AUTOMATED COUNT: 14.4 % (ref 11.5–16)
EST. AVERAGE GLUCOSE BLD GHB EST-MCNC: 134 MG/DL (ref 68–126)
GLOBULIN PLAS-MCNC: 4.3 G/DL (ref 2.5–4)
GLUCOSE BLD-MCNC: 100 MG/DL (ref 70–99)
HBA1C MFR BLD HPLC: 6.3 % (ref ?–5.7)
HCT VFR BLD AUTO: 40.6 % (ref 34–50)
HDLC SERPL-MCNC: 65 MG/DL (ref 40–59)
HGB BLD-MCNC: 12.9 G/DL (ref 12–16)
IMMATURE GRANULOCYTE COUNT: 0.04 X10(3) UL (ref 0–1)
IMMATURE GRANULOCYTE RATIO %: 0.4 %
LDLC SERPL CALC-MCNC: 119 MG/DL (ref ?–100)
LYMPHOCYTES # BLD AUTO: 2.22 X10(3) UL (ref 0.9–4)
LYMPHOCYTES NFR BLD AUTO: 23.8 %
M PROTEIN MFR SERPL ELPH: 7.9 G/DL (ref 6.1–8.3)
MCH RBC QN AUTO: 28.2 PG (ref 27–33.2)
MCHC RBC AUTO-ENTMCNC: 31.8 G/DL (ref 31–37)
MCV RBC AUTO: 88.8 FL (ref 81–100)
MONOCYTES # BLD AUTO: 0.78 X10(3) UL (ref 0.1–1)
MONOCYTES NFR BLD AUTO: 8.4 %
NEUTROPHIL ABS PRELIM: 5.95 X10 (3) UL (ref 1.3–6.7)
NEUTROPHILS # BLD AUTO: 5.95 X10(3) UL (ref 1.3–6.7)
NEUTROPHILS NFR BLD AUTO: 63.8 %
NONHDLC SERPL-MCNC: 137 MG/DL (ref ?–130)
OSMOLALITY SERPL CALC.SUM OF ELEC: 293 MOSM/KG (ref 275–295)
PLATELET # BLD AUTO: 333 10(3)UL (ref 150–450)
POTASSIUM SERPL-SCNC: 4.4 MMOL/L (ref 3.6–5.1)
RBC # BLD AUTO: 4.57 X10(6)UL (ref 3.8–5.1)
RED CELL DISTRIBUTION WIDTH-SD: 46 FL (ref 35.1–46.3)
SODIUM SERPL-SCNC: 140 MMOL/L (ref 136–144)
T PALLIDUM AB SER QL IA: NONREACTIVE
TRIGL SERPL-MCNC: 89 MG/DL (ref 30–149)
VIT D+METAB SERPL-MCNC: 24 NG/ML (ref 30–100)
VLDLC SERPL CALC-MCNC: 18 MG/DL (ref 0–30)
WBC # BLD AUTO: 9.3 X10(3) UL (ref 4–13)

## 2018-08-28 PROCEDURE — 87389 HIV-1 AG W/HIV-1&-2 AB AG IA: CPT | Performed by: FAMILY MEDICINE

## 2018-08-28 PROCEDURE — 99214 OFFICE O/P EST MOD 30 MIN: CPT | Performed by: FAMILY MEDICINE

## 2018-08-28 PROCEDURE — 36415 COLL VENOUS BLD VENIPUNCTURE: CPT | Performed by: FAMILY MEDICINE

## 2018-08-28 PROCEDURE — 86695 HERPES SIMPLEX TYPE 1 TEST: CPT | Performed by: FAMILY MEDICINE

## 2018-08-28 PROCEDURE — 86780 TREPONEMA PALLIDUM: CPT | Performed by: FAMILY MEDICINE

## 2018-08-28 PROCEDURE — 87591 N.GONORRHOEAE DNA AMP PROB: CPT | Performed by: FAMILY MEDICINE

## 2018-08-28 PROCEDURE — 83036 HEMOGLOBIN GLYCOSYLATED A1C: CPT | Performed by: FAMILY MEDICINE

## 2018-08-28 PROCEDURE — 85025 COMPLETE CBC W/AUTO DIFF WBC: CPT | Performed by: FAMILY MEDICINE

## 2018-08-28 PROCEDURE — 80061 LIPID PANEL: CPT | Performed by: FAMILY MEDICINE

## 2018-08-28 PROCEDURE — 80053 COMPREHEN METABOLIC PANEL: CPT | Performed by: FAMILY MEDICINE

## 2018-08-28 PROCEDURE — 87491 CHLMYD TRACH DNA AMP PROBE: CPT | Performed by: FAMILY MEDICINE

## 2018-08-28 PROCEDURE — 86696 HERPES SIMPLEX TYPE 2 TEST: CPT | Performed by: FAMILY MEDICINE

## 2018-08-28 PROCEDURE — 82306 VITAMIN D 25 HYDROXY: CPT | Performed by: FAMILY MEDICINE

## 2018-08-28 RX ORDER — HYDROCODONE BITARTRATE AND ACETAMINOPHEN 5; 325 MG/1; MG/1
1 TABLET ORAL EVERY 6 HOURS PRN
Qty: 30 TABLET | Refills: 0 | Status: SHIPPED | OUTPATIENT
Start: 2018-08-28 | End: 2019-04-11

## 2018-08-28 RX ORDER — ALPRAZOLAM 0.5 MG/1
TABLET ORAL
Qty: 30 TABLET | Refills: 0 | Status: SHIPPED | OUTPATIENT
Start: 2018-08-28 | End: 2019-04-11

## 2018-08-28 RX ORDER — ESCITALOPRAM OXALATE 20 MG/1
20 TABLET ORAL DAILY
Qty: 90 TABLET | Refills: 0 | Status: SHIPPED | OUTPATIENT
Start: 2018-08-28 | End: 2019-02-28

## 2018-08-28 RX ORDER — ATORVASTATIN CALCIUM 10 MG/1
TABLET, FILM COATED ORAL
Qty: 90 TABLET | Refills: 0 | Status: SHIPPED | OUTPATIENT
Start: 2018-08-28 | End: 2018-10-09

## 2018-08-28 RX ORDER — ESCITALOPRAM OXALATE 20 MG/1
20 TABLET ORAL DAILY
Qty: 90 TABLET | Refills: 0 | Status: SHIPPED | OUTPATIENT
Start: 2018-08-28 | End: 2018-08-28

## 2018-08-28 RX ORDER — METOPROLOL TARTRATE 50 MG/1
50 TABLET, FILM COATED ORAL 2 TIMES DAILY
Qty: 180 TABLET | Refills: 0 | Status: SHIPPED | OUTPATIENT
Start: 2018-08-28 | End: 2019-02-11

## 2018-08-28 RX ORDER — PANTOPRAZOLE SODIUM 40 MG/1
40 TABLET, DELAYED RELEASE ORAL
Qty: 90 TABLET | Refills: 0 | Status: SHIPPED | OUTPATIENT
Start: 2018-08-28 | End: 2018-08-29

## 2018-08-28 NOTE — PROGRESS NOTES
CHIEF COMPLAINT:     Patient presents with:  Medication Follow-Up: all       HPI:   Connie Martin is a 46year old female   Patient presents for recheck of  Hypertension and hyperlipdemia.  Pt has been taking medications as instructed, no medication si mg total) by mouth daily. Disp: 90 tablet Rfl: 0   Metoprolol Tartrate 50 MG Oral Tab Take 1 tablet (50 mg total) by mouth 2 (two) times daily.  Disp: 180 tablet Rfl: 0   atorvastatin 10 MG Oral Tab TAKE 1 TABLET BY MOUTH EVERY NIGHT AT BEDTIME Disp: 90 tab nourished,in no apparent distress  SKIN: no rashes,no suspicious lesions  HEAD: atraumatic, normocephalic  EYES: conjunctiva clear, EOM intact, PERRLA  HEENT: ears,nose and throat clear  NECK: supple, non-tender  LUNGS: clear to auscultation bilaterally, n tablet (40 mg total) by mouth every morning before breakfast.           Imaging & Consults:  OP REFERRAL TO DIAGNOSTIC SLEEP STUDY  [facility] 2D+3D SCREENING BILAT (CPT=77067/97395)    1.  Dysthymia  - stable, continue medication  - escitalopram 20 MG Oral T anemia, unspecified iron deficiency anemia type  - labs today  - CBC WITH DIFFERENTIAL WITH PLATELET; Future    8.  Impaired fasting glucose  Pt to watch starchy/carb foods as they can add to sugar load and try to increase activity especially walking as you

## 2018-08-28 NOTE — TELEPHONE ENCOUNTER
Pharmacy calling states that pt insurance will not cover medication unless it is a 90 day supply   escitalopram 20 MG   [facility]/PHARMACY #[id number]- [address], [address] [facility] Alec Zaman  [address] [facility], [phone], [phone]

## 2018-08-29 ENCOUNTER — TELEPHONE (OUTPATIENT)
Dept: INTERNAL MEDICINE CLINIC | Facility: CLINIC | Age: 53
End: 2018-08-29

## 2018-08-29 DIAGNOSIS — R12 HEARTBURN: Primary | ICD-10-CM

## 2018-08-29 LAB
C TRACH DNA SPEC QL NAA+PROBE: NEGATIVE
HSV 1 GLYCOPROTEIN G AB, IGG: 22.5 IV
HSV 2 GLYCOPROTEIN G AB, IGG: 0.08 IV
N GONORRHOEA DNA SPEC QL NAA+PROBE: NEGATIVE

## 2018-08-29 NOTE — TELEPHONE ENCOUNTER
Received request for alternative medication Pantoprazole. Suggested alternatives: Lansoprazole 30 mg, Rabeprazole 20 mg, Omeprazole 20 mg, Esomeprazole 20 mg     Please advise if you would like to change to one of the alternatives?

## 2018-08-30 RX ORDER — LANSOPRAZOLE 30 MG/1
30 CAPSULE, DELAYED RELEASE ORAL
Qty: 90 CAPSULE | Refills: 1 | Status: SHIPPED | OUTPATIENT
Start: 2018-08-30 | End: 2019-04-11

## 2018-08-30 NOTE — TELEPHONE ENCOUNTER
Lansoprazole 30mg Dispersible Tablet not covered under patient's insurance plan. Suggested alternatives: Lansoprazole DR 30mg capsule, Rabeprazole Sod Dr 20mg tablet, Omeprazole Dr 20mg capsule.

## 2018-10-09 DIAGNOSIS — E78.00 PURE HYPERCHOLESTEROLEMIA: ICD-10-CM

## 2018-10-09 RX ORDER — ATORVASTATIN CALCIUM 10 MG/1
TABLET, FILM COATED ORAL
Qty: 90 TABLET | Refills: 1 | Status: SHIPPED | OUTPATIENT
Start: 2018-10-09 | End: 2019-04-11

## 2019-01-04 ENCOUNTER — HOSPITAL ENCOUNTER (OUTPATIENT)
Dept: MAMMOGRAPHY | Age: 54
Discharge: HOME | End: 2019-01-04
Attending: FAMILY MEDICINE
Payer: COMMERCIAL

## 2019-01-04 DIAGNOSIS — Z12.31 ENCOUNTER FOR SCREENING MAMMOGRAM FOR MALIGNANT NEOPLASM OF BREAST: ICD-10-CM

## 2019-01-04 PROCEDURE — 77063 BREAST TOMOSYNTHESIS BI: CPT | Performed by: FAMILY MEDICINE

## 2019-01-04 PROCEDURE — 77067 SCR MAMMO BI INCL CAD: CPT | Performed by: FAMILY MEDICINE

## 2019-02-07 DIAGNOSIS — I10 ESSENTIAL HYPERTENSION, BENIGN: ICD-10-CM

## 2019-02-07 RX ORDER — METOPROLOL TARTRATE 50 MG/1
TABLET, FILM COATED ORAL
Qty: 180 TABLET | Refills: 0 | OUTPATIENT
Start: 2019-02-07

## 2019-02-07 NOTE — TELEPHONE ENCOUNTER
Last OV: 8/28/18 with Corrin Mcburney, NP  Last refill date: 8/28/18     #/refills: #180, 0 refills  When pt was asked to return for OV: 6 months (around 2/28/19)  Upcoming appt/reason: no upcoming appt  Last labs 8/28/18

## 2019-02-11 DIAGNOSIS — I10 ESSENTIAL HYPERTENSION, BENIGN: ICD-10-CM

## 2019-02-11 RX ORDER — METOPROLOL TARTRATE 50 MG/1
TABLET, FILM COATED ORAL
Qty: 180 TABLET | Refills: 0 | Status: SHIPPED | OUTPATIENT
Start: 2019-02-11 | End: 2019-05-12

## 2019-02-11 NOTE — TELEPHONE ENCOUNTER
Approved per protocol Metoprolol  Last OV relevant to medication: 8-28-18  Last refill date: 8-28-18   #/refills: 0  When pt was asked to return for OV: 6 months  Upcoming appt/reason: none  Recent labs: 8-28-18: T Padllidum/ HSV/ HIV/ HgBA1C/ Vit. D/ Lipid

## 2019-02-28 DIAGNOSIS — F34.1 DYSTHYMIA: ICD-10-CM

## 2019-02-28 DIAGNOSIS — F41.9 ANXIETY: ICD-10-CM

## 2019-02-28 RX ORDER — ESCITALOPRAM OXALATE 20 MG/1
20 TABLET ORAL DAILY
Qty: 30 TABLET | Refills: 0 | Status: SHIPPED | OUTPATIENT
Start: 2019-02-28 | End: 2019-04-05

## 2019-02-28 NOTE — TELEPHONE ENCOUNTER
Escitalopram   Last OV relevant to medication: 8-28-18  Last refill date: 8-28-18  #/refills: 0  When pt was asked to return for OV: 6 months  Upcoming appt/reason: none  Recent labs: 8-28-18: T Padllidum/ HSV/ HIV/ HgBA1C/ Vit. D/ Lipid/ CMP/ CBC/ Chlamdyia

## 2019-04-05 DIAGNOSIS — F41.9 ANXIETY: ICD-10-CM

## 2019-04-05 DIAGNOSIS — F34.1 DYSTHYMIA: ICD-10-CM

## 2019-04-05 NOTE — TELEPHONE ENCOUNTER
[facility] is requesting a prescription for escitalopram 20 MG Oral Tab for for a 90 day supply. Needs to be sent to [facility], [address] [facility] , [phone], [phone].

## 2019-04-05 NOTE — TELEPHONE ENCOUNTER
Patient is scheduled for 04/11/19 for her annual physical and medication follow up. Patient is out of her prescription for escitalopram 20 MG Oral Tab. Needs to be filled for a 90 day supply in order for her insurance to cover.  [facility]

## 2019-04-06 RX ORDER — ESCITALOPRAM OXALATE 20 MG/1
20 TABLET ORAL DAILY
Qty: 90 TABLET | Refills: 0 | Status: SHIPPED | OUTPATIENT
Start: 2019-04-06 | End: 2019-06-29

## 2019-04-06 NOTE — TELEPHONE ENCOUNTER
Patient is out of her prescription for escitalopram 20 MG Oral Tab. Needs to be filled for a 90 day supply in order for her insurance to cover.     Escitalopram   Last OV relevant to medication: 8-28-18  Last refill date: 2-28-19   #/refills: 0  When pt was

## 2019-04-11 ENCOUNTER — OFFICE VISIT (OUTPATIENT)
Dept: INTERNAL MEDICINE CLINIC | Facility: CLINIC | Age: 54
End: 2019-04-11
Payer: COMMERCIAL

## 2019-04-11 VITALS
OXYGEN SATURATION: 98 % | HEIGHT: 65 IN | TEMPERATURE: 99 F | DIASTOLIC BLOOD PRESSURE: 82 MMHG | RESPIRATION RATE: 16 BRPM | SYSTOLIC BLOOD PRESSURE: 138 MMHG | HEART RATE: 68 BPM | WEIGHT: 260 LBS | BODY MASS INDEX: 43.32 KG/M2

## 2019-04-11 DIAGNOSIS — M21.612 BUNION, LEFT FOOT: ICD-10-CM

## 2019-04-11 DIAGNOSIS — Z23 NEED FOR TDAP VACCINATION: ICD-10-CM

## 2019-04-11 DIAGNOSIS — E55.9 VITAMIN D DEFICIENCY: ICD-10-CM

## 2019-04-11 DIAGNOSIS — E78.00 PURE HYPERCHOLESTEROLEMIA: ICD-10-CM

## 2019-04-11 DIAGNOSIS — G89.29 CHRONIC PAIN OF RIGHT ANKLE: ICD-10-CM

## 2019-04-11 DIAGNOSIS — N93.0 POSTCOITAL BLEEDING: ICD-10-CM

## 2019-04-11 DIAGNOSIS — M25.571 CHRONIC PAIN OF RIGHT ANKLE: ICD-10-CM

## 2019-04-11 DIAGNOSIS — Z11.3 SCREENING FOR STD (SEXUALLY TRANSMITTED DISEASE): ICD-10-CM

## 2019-04-11 DIAGNOSIS — Z00.00 LABORATORY EXAMINATION ORDERED AS PART OF A ROUTINE GENERAL MEDICAL EXAMINATION: ICD-10-CM

## 2019-04-11 DIAGNOSIS — F34.1 DYSTHYMIA: ICD-10-CM

## 2019-04-11 DIAGNOSIS — Z00.00 ROUTINE GENERAL MEDICAL EXAMINATION AT A HEALTH CARE FACILITY: Primary | ICD-10-CM

## 2019-04-11 DIAGNOSIS — F41.9 ANXIETY: ICD-10-CM

## 2019-04-11 DIAGNOSIS — D64.9 ANEMIA, UNSPECIFIED TYPE: ICD-10-CM

## 2019-04-11 PROCEDURE — 99214 OFFICE O/P EST MOD 30 MIN: CPT | Performed by: FAMILY MEDICINE

## 2019-04-11 PROCEDURE — 90715 TDAP VACCINE 7 YRS/> IM: CPT | Performed by: FAMILY MEDICINE

## 2019-04-11 PROCEDURE — 90471 IMMUNIZATION ADMIN: CPT | Performed by: FAMILY MEDICINE

## 2019-04-11 PROCEDURE — 99396 PREV VISIT EST AGE 40-64: CPT | Performed by: FAMILY MEDICINE

## 2019-04-11 RX ORDER — ATORVASTATIN CALCIUM 10 MG/1
TABLET, FILM COATED ORAL
Qty: 90 TABLET | Refills: 0 | Status: SHIPPED | OUTPATIENT
Start: 2019-04-11 | End: 2019-09-05

## 2019-04-11 RX ORDER — MELATONIN
325
Qty: 90 TABLET | Refills: 1 | Status: SHIPPED | OUTPATIENT
Start: 2019-04-11 | End: 2020-05-05

## 2019-04-11 RX ORDER — ALPRAZOLAM 0.5 MG/1
TABLET ORAL
Qty: 30 TABLET | Refills: 0 | Status: SHIPPED | OUTPATIENT
Start: 2019-04-11 | End: 2019-10-21

## 2019-04-11 RX ORDER — ERGOCALCIFEROL 1.25 MG/1
1 CAPSULE ORAL WEEKLY
Refills: 1 | COMMUNITY
Start: 2019-02-10 | End: 2019-10-21 | Stop reason: ALTCHOICE

## 2019-04-11 RX ORDER — HYDROCODONE BITARTRATE AND ACETAMINOPHEN 5; 325 MG/1; MG/1
1 TABLET ORAL EVERY 6 HOURS PRN
Qty: 30 TABLET | Refills: 0 | Status: SHIPPED | OUTPATIENT
Start: 2019-04-11 | End: 2019-10-21

## 2019-04-11 NOTE — PROGRESS NOTES
HPI:   Morgan Vásquez is a 46year old female who presents for a complete physical exam. Symptoms: denies discharge, itching, burning or dysuria, is menopausal. Patient complains of bleeding with intercourse, states new partner a little larger in size. had pedicure where they scrapped it and cut lump. States its been there for about 5 months, nothing makes it better and nothing makes it worse.        BP Readings from Last 3 Encounters:  04/11/19 : 138/82  08/28/18 : 106/70  01/31/18 : 142/96        Screen Medications:  ergocalciferol 31194 units Oral Cap Take 1 capsule by mouth once a week.  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 : . Children: 2  Exercise: three times per week.   Diet: watches f intact    ASSESSMENT AND PLAN:   Hansel Robertson is a 46year old female who presents for a complete physical exam.     1. Laboratory examination ordered as part of a routine general medical examination  - HEMOGLOBIN A1C; Future  - LIPID PANEL;  Future medication. - ferrous sulfate 325 (65 FE) MG Oral Tab EC; Take 1 tablet (325 mg total) by mouth daily with breakfast.  Dispense: 90 tablet; Refill: 1    9. Screening for STD (sexually transmitted disease)  - CHLAMYDIA/GONOCOCCUS, HALEIGH;  Future  - T PALLIDUM sulfate 325 (65 FE) MG Oral Tab EC 90 tablet 1     Sig: Take 1 tablet (325 mg total) by mouth daily with breakfast.       Imaging & Consults:  TETANUS, DIPHTHERIA TOXOIDS AND ACELLULAR PERTUSIS VACCINE (TDAP), >7 YEARS, IM USE      The patient indicates un

## 2019-04-17 ENCOUNTER — TELEPHONE (OUTPATIENT)
Dept: INTERNAL MEDICINE CLINIC | Facility: CLINIC | Age: 54
End: 2019-04-17

## 2019-04-17 NOTE — TELEPHONE ENCOUNTER
Received fax from health lab: [facility] for Gynecologic report. Placed on Karen's basket to review.

## 2019-05-02 DIAGNOSIS — E55.9 VITAMIN D DEFICIENCY: ICD-10-CM

## 2019-05-02 RX ORDER — ERGOCALCIFEROL 1.25 MG/1
CAPSULE ORAL
Qty: 12 CAPSULE | Refills: 1 | OUTPATIENT
Start: 2019-05-02

## 2019-05-12 DIAGNOSIS — I10 ESSENTIAL HYPERTENSION, BENIGN: ICD-10-CM

## 2019-05-13 RX ORDER — METOPROLOL TARTRATE 50 MG/1
TABLET, FILM COATED ORAL
Qty: 180 TABLET | Refills: 0 | Status: SHIPPED | OUTPATIENT
Start: 2019-05-13 | End: 2019-08-20

## 2019-05-31 ENCOUNTER — LAB ENCOUNTER (OUTPATIENT)
Dept: LAB | Age: 54
End: 2019-05-31
Attending: FAMILY MEDICINE
Payer: COMMERCIAL

## 2019-05-31 DIAGNOSIS — Z00.00 LABORATORY EXAMINATION ORDERED AS PART OF A ROUTINE GENERAL MEDICAL EXAMINATION: ICD-10-CM

## 2019-05-31 DIAGNOSIS — E55.9 VITAMIN D DEFICIENCY: ICD-10-CM

## 2019-05-31 DIAGNOSIS — Z11.3 SCREENING FOR STD (SEXUALLY TRANSMITTED DISEASE): ICD-10-CM

## 2019-05-31 PROCEDURE — 36415 COLL VENOUS BLD VENIPUNCTURE: CPT | Performed by: FAMILY MEDICINE

## 2019-05-31 PROCEDURE — 87389 HIV-1 AG W/HIV-1&-2 AB AG IA: CPT | Performed by: FAMILY MEDICINE

## 2019-05-31 PROCEDURE — 80050 GENERAL HEALTH PANEL: CPT | Performed by: FAMILY MEDICINE

## 2019-05-31 PROCEDURE — 83036 HEMOGLOBIN GLYCOSYLATED A1C: CPT | Performed by: FAMILY MEDICINE

## 2019-05-31 PROCEDURE — 82306 VITAMIN D 25 HYDROXY: CPT | Performed by: FAMILY MEDICINE

## 2019-05-31 PROCEDURE — 80061 LIPID PANEL: CPT | Performed by: FAMILY MEDICINE

## 2019-05-31 PROCEDURE — 86780 TREPONEMA PALLIDUM: CPT | Performed by: FAMILY MEDICINE

## 2019-06-29 DIAGNOSIS — F34.1 DYSTHYMIA: ICD-10-CM

## 2019-06-29 DIAGNOSIS — F41.9 ANXIETY: ICD-10-CM

## 2019-07-01 RX ORDER — ESCITALOPRAM OXALATE 20 MG/1
TABLET ORAL
Qty: 90 TABLET | Refills: 0 | Status: SHIPPED | OUTPATIENT
Start: 2019-07-01 | End: 2019-10-01

## 2019-07-01 NOTE — TELEPHONE ENCOUNTER
Escitalopram  Last OV relevant to medication: 4-11-19  Last refill date: 4-6-19  #/refills: 0  When pt was asked to return for OV: CPX 1 yr  Upcoming appt/reason: none  Recent labs: none

## 2019-07-02 DIAGNOSIS — R73.09 ELEVATED HEMOGLOBIN A1C MEASUREMENT: ICD-10-CM

## 2019-07-02 DIAGNOSIS — R74.8 ABNORMAL LIVER ENZYMES: Primary | ICD-10-CM

## 2019-07-03 ENCOUNTER — APPOINTMENT (OUTPATIENT)
Dept: LAB | Age: 54
End: 2019-07-03
Attending: FAMILY MEDICINE
Payer: COMMERCIAL

## 2019-07-03 DIAGNOSIS — R74.8 ABNORMAL LIVER ENZYMES: ICD-10-CM

## 2019-07-03 DIAGNOSIS — Z11.3 SCREENING FOR STD (SEXUALLY TRANSMITTED DISEASE): ICD-10-CM

## 2019-07-03 LAB
ALT SERPL-CCNC: 46 U/L (ref 13–56)
AST SERPL-CCNC: 45 U/L (ref 15–37)

## 2019-07-03 PROCEDURE — 84450 TRANSFERASE (AST) (SGOT): CPT | Performed by: FAMILY MEDICINE

## 2019-07-03 PROCEDURE — 84460 ALANINE AMINO (ALT) (SGPT): CPT | Performed by: FAMILY MEDICINE

## 2019-07-03 PROCEDURE — 87491 CHLMYD TRACH DNA AMP PROBE: CPT | Performed by: FAMILY MEDICINE

## 2019-07-03 PROCEDURE — 36415 COLL VENOUS BLD VENIPUNCTURE: CPT | Performed by: FAMILY MEDICINE

## 2019-07-03 PROCEDURE — 87591 N.GONORRHOEAE DNA AMP PROB: CPT | Performed by: FAMILY MEDICINE

## 2019-07-05 LAB
C TRACH DNA SPEC QL NAA+PROBE: NEGATIVE
N GONORRHOEA DNA SPEC QL NAA+PROBE: NEGATIVE

## 2019-07-11 ENCOUNTER — TELEPHONE (OUTPATIENT)
Dept: INTERNAL MEDICINE CLINIC | Facility: CLINIC | Age: 54
End: 2019-07-11

## 2019-07-11 DIAGNOSIS — R74.01 ELEVATED AST (SGOT): Primary | ICD-10-CM

## 2019-07-11 NOTE — TELEPHONE ENCOUNTER
Patient calling regarding her labs and enzymes levels and also has questions regarding pain medication

## 2019-07-11 NOTE — TELEPHONE ENCOUNTER
----- Message from TEJ Barahona sent at 7/6/2019  8:56 AM CDT -----  GC and Chlamydia negative. ALT ok. AST a little better but still up, Recheck in 3 months

## 2019-08-20 DIAGNOSIS — I10 ESSENTIAL HYPERTENSION, BENIGN: ICD-10-CM

## 2019-08-20 RX ORDER — METOPROLOL TARTRATE 50 MG/1
TABLET, FILM COATED ORAL
Qty: 180 TABLET | Refills: 0 | Status: SHIPPED | OUTPATIENT
Start: 2019-08-20 | End: 2019-10-21

## 2019-08-20 NOTE — TELEPHONE ENCOUNTER
Metoprolol Tartrate 50 Mg  Last OV relevant to medication: 4-11-19  Last refill date: 5-13-19  #/refills: 0  When pt was asked to return for OV: CPX 1 yr  Upcoming appt/reason: none  Recent labs: 5-31-19: CMP

## 2019-09-05 DIAGNOSIS — E78.00 PURE HYPERCHOLESTEROLEMIA: ICD-10-CM

## 2019-09-05 RX ORDER — ATORVASTATIN CALCIUM 10 MG/1
TABLET, FILM COATED ORAL
Qty: 90 TABLET | Refills: 0 | Status: SHIPPED | OUTPATIENT
Start: 2019-09-05 | End: 2019-10-21

## 2019-09-05 NOTE — TELEPHONE ENCOUNTER
Atorvastatin 10 Mg  Last OV relevant to medication: 4-11-19  Last refill date: 4-11-19  #/refills: 0  When pt was asked to return for OV: CPX 1 yr  Upcoming appt/reason: none  Recent labs: 5-31-19: Lipid

## 2019-10-01 DIAGNOSIS — F41.9 ANXIETY: ICD-10-CM

## 2019-10-01 DIAGNOSIS — F34.1 DYSTHYMIA: ICD-10-CM

## 2019-10-01 NOTE — TELEPHONE ENCOUNTER
Pt is due for medication f/u. [facility] [employer].      Escitalopram 20 Mg  Last OV relevant to medication: 4-11-19  Last refill date: 7-1-19  #/refills: 0  When pt was asked to return for OV: CPX 1 yr  Upcoming appt/reason: none  Recent labs: none

## 2019-10-03 RX ORDER — ESCITALOPRAM OXALATE 20 MG/1
20 TABLET ORAL DAILY
Qty: 30 TABLET | Refills: 0 | Status: SHIPPED | OUTPATIENT
Start: 2019-10-03 | End: 2019-10-21

## 2019-10-21 ENCOUNTER — OFFICE VISIT (OUTPATIENT)
Dept: INTERNAL MEDICINE CLINIC | Facility: CLINIC | Age: 54
End: 2019-10-21
Payer: COMMERCIAL

## 2019-10-21 VITALS
BODY MASS INDEX: 43.74 KG/M2 | SYSTOLIC BLOOD PRESSURE: 126 MMHG | OXYGEN SATURATION: 95 % | HEART RATE: 62 BPM | DIASTOLIC BLOOD PRESSURE: 72 MMHG | HEIGHT: 65 IN | WEIGHT: 262.5 LBS | TEMPERATURE: 99 F | RESPIRATION RATE: 16 BRPM

## 2019-10-21 DIAGNOSIS — E78.00 PURE HYPERCHOLESTEROLEMIA: ICD-10-CM

## 2019-10-21 DIAGNOSIS — E55.9 VITAMIN D DEFICIENCY: ICD-10-CM

## 2019-10-21 DIAGNOSIS — I10 ESSENTIAL HYPERTENSION, BENIGN: Primary | ICD-10-CM

## 2019-10-21 DIAGNOSIS — K64.4 EXTERNAL HEMORRHOIDS: ICD-10-CM

## 2019-10-21 DIAGNOSIS — F34.1 DYSTHYMIA: ICD-10-CM

## 2019-10-21 DIAGNOSIS — G89.29 CHRONIC PAIN OF RIGHT ANKLE: ICD-10-CM

## 2019-10-21 DIAGNOSIS — R73.01 IMPAIRED FASTING GLUCOSE: ICD-10-CM

## 2019-10-21 DIAGNOSIS — R06.83 SNORING: ICD-10-CM

## 2019-10-21 DIAGNOSIS — M25.571 CHRONIC PAIN OF RIGHT ANKLE: ICD-10-CM

## 2019-10-21 DIAGNOSIS — F41.9 ANXIETY: ICD-10-CM

## 2019-10-21 PROCEDURE — 99214 OFFICE O/P EST MOD 30 MIN: CPT | Performed by: FAMILY MEDICINE

## 2019-10-21 RX ORDER — ESCITALOPRAM OXALATE 20 MG/1
20 TABLET ORAL DAILY
Qty: 90 TABLET | Refills: 0 | Status: SHIPPED | OUTPATIENT
Start: 2019-10-21 | End: 2020-01-14

## 2019-10-21 RX ORDER — HYDROCODONE BITARTRATE AND ACETAMINOPHEN 5; 325 MG/1; MG/1
1 TABLET ORAL EVERY 6 HOURS PRN
Qty: 30 TABLET | Refills: 0 | Status: SHIPPED | OUTPATIENT
Start: 2019-10-21 | End: 2020-05-05

## 2019-10-21 RX ORDER — DIPHENHYDRAMINE HCL 25 MG
25 CAPSULE ORAL EVERY 6 HOURS PRN
COMMUNITY

## 2019-10-21 RX ORDER — ALPRAZOLAM 0.5 MG/1
TABLET ORAL
Qty: 30 TABLET | Refills: 0 | Status: SHIPPED | OUTPATIENT
Start: 2019-10-21 | End: 2020-05-05

## 2019-10-21 RX ORDER — ATORVASTATIN CALCIUM 10 MG/1
TABLET, FILM COATED ORAL
Qty: 90 TABLET | Refills: 0 | Status: SHIPPED | OUTPATIENT
Start: 2019-10-21 | End: 2020-01-02 | Stop reason: DRUGHIGH

## 2019-10-21 RX ORDER — METOPROLOL TARTRATE 50 MG/1
50 TABLET, FILM COATED ORAL 2 TIMES DAILY
Qty: 180 TABLET | Refills: 0 | Status: SHIPPED | OUTPATIENT
Start: 2019-10-21 | End: 2020-01-27

## 2019-10-21 NOTE — PROGRESS NOTES
CHIEF COMPLAINT:     Patient presents with:  Medication Follow-Up: Refills needed on everything. HPI:   Christen Kapoor is a 53year old female   Patient presents for recheck of  Hypertension and hyperlipdemia.  Pt has been taking medications as ins hemorrhoid has not shrunk and would like a script. Cholecalciferol (VITAMIN D) 1000 units Oral Tab, Take 1 tablet by mouth daily. , Disp: , Rfl:   diphenhydrAMINE HCl (BENADRYL ALLERGY) 25 MG Oral Cap, Take 25 mg by mouth every 6 (six) hours as needed fo tinnitus. CHEST: Denies chest pain, or palpitations  LUNGS: Denies shortness of breath, cough, or wheezing  GI: Denies abdominal pain, N/V/C/D.  See HPI  MUSCULOSKELETAL: Denies any arthralgia,myalgias or swollen joints  LYMPH:  Denies lymphadenopathy  SREE Oral Tab 30 tablet 0     Sig: TAKE 1 TABLET BY MOUTH 2 TIMES DAILY AS NEEDED FOR AXIETY   • hydrocortisone 2.5 % Rectal Cream 1 Tube 1     Sig: Place 1 Application rectally 2 (two) times daily.  Please provide Pt with applicator       Imaging & Consults:  O AS NEEDED FOR AXIETY  Dispense: 30 tablet; Refill: 0     8. Anemia, unspecified type  Stable, continue current medication. - ferrous sulfate 325 (65 FE)      The patient indicates understanding of these issues and agrees to the plan.   The patient is asked

## 2019-12-31 ENCOUNTER — APPOINTMENT (OUTPATIENT)
Dept: LAB | Age: 54
End: 2019-12-31
Attending: FAMILY MEDICINE
Payer: COMMERCIAL

## 2019-12-31 DIAGNOSIS — R73.01 IMPAIRED FASTING GLUCOSE: ICD-10-CM

## 2019-12-31 DIAGNOSIS — R74.01 ELEVATED AST (SGOT): ICD-10-CM

## 2019-12-31 DIAGNOSIS — E55.9 VITAMIN D DEFICIENCY: ICD-10-CM

## 2019-12-31 DIAGNOSIS — R73.09 ELEVATED HEMOGLOBIN A1C MEASUREMENT: ICD-10-CM

## 2019-12-31 DIAGNOSIS — E78.00 PURE HYPERCHOLESTEROLEMIA: ICD-10-CM

## 2019-12-31 DIAGNOSIS — I10 ESSENTIAL HYPERTENSION, BENIGN: ICD-10-CM

## 2019-12-31 LAB
ALBUMIN SERPL-MCNC: 3.6 G/DL (ref 3.4–5)
ALBUMIN/GLOB SERPL: 0.9 {RATIO} (ref 1–2)
ALP LIVER SERPL-CCNC: 102 U/L (ref 41–108)
ALT SERPL-CCNC: 43 U/L (ref 13–56)
ANION GAP SERPL CALC-SCNC: 4 MMOL/L (ref 0–18)
AST SERPL-CCNC: 38 U/L (ref 15–37)
BILIRUB SERPL-MCNC: 0.4 MG/DL (ref 0.1–2)
BUN BLD-MCNC: 13 MG/DL (ref 7–18)
BUN/CREAT SERPL: 15.3 (ref 10–20)
CALCIUM BLD-MCNC: 9.2 MG/DL (ref 8.5–10.1)
CHLORIDE SERPL-SCNC: 107 MMOL/L (ref 98–112)
CHOLEST SMN-MCNC: 203 MG/DL (ref ?–200)
CO2 SERPL-SCNC: 28 MMOL/L (ref 21–32)
CREAT BLD-MCNC: 0.85 MG/DL (ref 0.55–1.02)
EST. AVERAGE GLUCOSE BLD GHB EST-MCNC: 140 MG/DL (ref 68–126)
GLOBULIN PLAS-MCNC: 4.2 G/DL (ref 2.8–4.4)
GLUCOSE BLD-MCNC: 109 MG/DL (ref 70–99)
HBA1C MFR BLD HPLC: 6.5 % (ref ?–5.7)
HDLC SERPL-MCNC: 57 MG/DL (ref 40–59)
LDLC SERPL CALC-MCNC: 125 MG/DL (ref ?–100)
M PROTEIN MFR SERPL ELPH: 7.8 G/DL (ref 6.4–8.2)
NONHDLC SERPL-MCNC: 146 MG/DL (ref ?–130)
OSMOLALITY SERPL CALC.SUM OF ELEC: 289 MOSM/KG (ref 275–295)
PATIENT FASTING Y/N/NP: YES
PATIENT FASTING Y/N/NP: YES
POTASSIUM SERPL-SCNC: 4.3 MMOL/L (ref 3.5–5.1)
SODIUM SERPL-SCNC: 139 MMOL/L (ref 136–145)
TRIGL SERPL-MCNC: 104 MG/DL (ref 30–149)
VIT D+METAB SERPL-MCNC: 31.9 NG/ML (ref 30–100)
VLDLC SERPL CALC-MCNC: 21 MG/DL (ref 0–30)

## 2019-12-31 PROCEDURE — 80061 LIPID PANEL: CPT | Performed by: FAMILY MEDICINE

## 2019-12-31 PROCEDURE — 83036 HEMOGLOBIN GLYCOSYLATED A1C: CPT | Performed by: FAMILY MEDICINE

## 2019-12-31 PROCEDURE — 80053 COMPREHEN METABOLIC PANEL: CPT | Performed by: FAMILY MEDICINE

## 2019-12-31 PROCEDURE — 82306 VITAMIN D 25 HYDROXY: CPT | Performed by: FAMILY MEDICINE

## 2019-12-31 PROCEDURE — 36415 COLL VENOUS BLD VENIPUNCTURE: CPT | Performed by: FAMILY MEDICINE

## 2020-01-02 DIAGNOSIS — R79.89 LOW SERUM VITAMIN D: ICD-10-CM

## 2020-01-02 DIAGNOSIS — R74.01 ELEVATED AST (SGOT): ICD-10-CM

## 2020-01-02 DIAGNOSIS — E78.00 PURE HYPERCHOLESTEROLEMIA: Primary | ICD-10-CM

## 2020-01-02 DIAGNOSIS — R73.09 ELEVATED HEMOGLOBIN A1C: ICD-10-CM

## 2020-01-02 RX ORDER — ATORVASTATIN CALCIUM 20 MG/1
20 TABLET, FILM COATED ORAL NIGHTLY
Qty: 90 TABLET | Refills: 1 | Status: SHIPPED | OUTPATIENT
Start: 2020-01-02 | End: 2020-05-05

## 2020-01-14 DIAGNOSIS — F34.1 DYSTHYMIA: ICD-10-CM

## 2020-01-14 DIAGNOSIS — F41.9 ANXIETY: ICD-10-CM

## 2020-01-14 RX ORDER — ESCITALOPRAM OXALATE 20 MG/1
TABLET ORAL
Qty: 90 TABLET | Refills: 0 | Status: SHIPPED | OUTPATIENT
Start: 2020-01-14 | End: 2020-04-14

## 2020-01-14 NOTE — TELEPHONE ENCOUNTER
Last OV: 10/21/19 with Beba Arellano NP  Last refill date: 10/21/19     #/refills: #90, 0 refills  When pt was asked to return for OV: 6 months  Upcoming appt/reason: no upcoming appt  Last labs 12/31/19

## 2020-01-27 DIAGNOSIS — I10 ESSENTIAL HYPERTENSION, BENIGN: ICD-10-CM

## 2020-01-27 RX ORDER — METOPROLOL TARTRATE 50 MG/1
TABLET, FILM COATED ORAL
Qty: 180 TABLET | Refills: 0 | Status: SHIPPED | OUTPATIENT
Start: 2020-01-27 | End: 2020-05-05

## 2020-01-27 NOTE — TELEPHONE ENCOUNTER
Metoprolol 50 Mg  Last OV relevant to medication: 10-21-19  Last refill date: 10-21-19 #/refills: 0  When pt was asked to return for OV: 6 mo.   Upcoming appt/reason: none  Recent labs: 12-21-19: CMP

## 2020-04-14 DIAGNOSIS — F41.9 ANXIETY: ICD-10-CM

## 2020-04-14 DIAGNOSIS — F34.1 DYSTHYMIA: ICD-10-CM

## 2020-04-14 RX ORDER — ESCITALOPRAM OXALATE 20 MG/1
20 TABLET ORAL DAILY
Qty: 90 TABLET | Refills: 0 | OUTPATIENT
Start: 2020-04-14

## 2020-04-14 RX ORDER — ESCITALOPRAM OXALATE 20 MG/1
TABLET ORAL
Qty: 30 TABLET | Refills: 0 | Status: SHIPPED | OUTPATIENT
Start: 2020-04-14 | End: 2020-05-05

## 2020-04-15 NOTE — TELEPHONE ENCOUNTER
LVM for patient to call back. Once appointment is scheduled, route back to clinical for 90 day supply.

## 2020-04-15 NOTE — TELEPHONE ENCOUNTER
Per 's routing comment: \"Ok to refill the 90 day but Pt needs to make an appt for May, even if it is just a TE appt.  Hopefully we will be seeing Patients by then\"

## 2020-04-20 DIAGNOSIS — I10 ESSENTIAL HYPERTENSION, BENIGN: ICD-10-CM

## 2020-05-05 ENCOUNTER — VIRTUAL PHONE E/M (OUTPATIENT)
Dept: INTERNAL MEDICINE CLINIC | Facility: CLINIC | Age: 55
End: 2020-05-05
Payer: COMMERCIAL

## 2020-05-05 DIAGNOSIS — D64.9 ANEMIA, UNSPECIFIED TYPE: ICD-10-CM

## 2020-05-05 DIAGNOSIS — G89.29 CHRONIC PAIN OF RIGHT ANKLE: ICD-10-CM

## 2020-05-05 DIAGNOSIS — I10 ESSENTIAL HYPERTENSION, BENIGN: Primary | ICD-10-CM

## 2020-05-05 DIAGNOSIS — F34.1 DYSTHYMIA: ICD-10-CM

## 2020-05-05 DIAGNOSIS — F41.9 ANXIETY: ICD-10-CM

## 2020-05-05 DIAGNOSIS — M25.571 CHRONIC PAIN OF RIGHT ANKLE: ICD-10-CM

## 2020-05-05 PROCEDURE — 99214 OFFICE O/P EST MOD 30 MIN: CPT | Performed by: NURSE PRACTITIONER

## 2020-05-05 RX ORDER — ESCITALOPRAM OXALATE 20 MG/1
20 TABLET ORAL DAILY
Qty: 90 TABLET | Refills: 0 | Status: SHIPPED | OUTPATIENT
Start: 2020-05-05 | End: 2020-08-20

## 2020-05-05 RX ORDER — HYDROCODONE BITARTRATE AND ACETAMINOPHEN 5; 325 MG/1; MG/1
1 TABLET ORAL EVERY 6 HOURS PRN
Qty: 30 TABLET | Refills: 0 | Status: SHIPPED | OUTPATIENT
Start: 2020-05-05 | End: 2020-08-20

## 2020-05-05 RX ORDER — METOPROLOL TARTRATE 50 MG/1
50 TABLET, FILM COATED ORAL 2 TIMES DAILY
Qty: 180 TABLET | Refills: 0 | Status: SHIPPED | OUTPATIENT
Start: 2020-05-05 | End: 2020-08-20

## 2020-05-05 RX ORDER — ALPRAZOLAM 0.5 MG/1
TABLET ORAL
Qty: 30 TABLET | Refills: 0 | Status: SHIPPED | OUTPATIENT
Start: 2020-05-05 | End: 2020-08-20

## 2020-05-05 RX ORDER — MELATONIN
325
Qty: 90 TABLET | Refills: 0 | Status: SHIPPED | OUTPATIENT
Start: 2020-05-05 | End: 2020-08-20

## 2020-05-05 RX ORDER — ATORVASTATIN CALCIUM 20 MG/1
20 TABLET, FILM COATED ORAL NIGHTLY
Qty: 90 TABLET | Refills: 0 | Status: SHIPPED | OUTPATIENT
Start: 2020-05-05 | End: 2020-08-20

## 2020-05-05 NOTE — PROGRESS NOTES
Virtual Telephone Check-In    [facility] verbally consents to a Virtual/Telephone Check-In visit on 05/05/20. Patient verified identification with name and date of birth.      Patient understands and accepts financial responsibility for any deductib norco usually with good relief. Last refill received 7 months ago for 30 tabs. Pt previously mentioned concerns of snoring, has not yet schedule sleep study and was holding off due to current pandemic.      Current Outpatient Medications   Medication Si or wheezing  GI: Denies abdominal pain, N/V/C/D.   MUSCULOSKELETAL: chronic ankle pain as above  NEURO: Denies headaches or lightheadedness      EXAM:     The patient is alert and oriented x3. Affect is appropriate.  There is no audible labored breatjing an crisis/national emergency and because of restrictions of visitation. There are limitations of this visit as no physical exam could be performed. Every conscious effort was taken to allow for sufficient and adequate time.   This billing was spent on review

## 2020-05-06 RX ORDER — METOPROLOL TARTRATE 50 MG/1
TABLET, FILM COATED ORAL
Qty: 180 TABLET | Refills: 0 | OUTPATIENT
Start: 2020-05-06

## 2020-07-06 PROCEDURE — 87624 HPV HI-RISK TYP POOLED RSLT: CPT | Performed by: OBSTETRICS & GYNECOLOGY

## 2020-07-06 PROCEDURE — 88175 CYTOPATH C/V AUTO FLUID REDO: CPT | Performed by: OBSTETRICS & GYNECOLOGY

## 2020-07-14 PROCEDURE — 88305 TISSUE EXAM BY PATHOLOGIST: CPT | Performed by: OBSTETRICS & GYNECOLOGY

## 2020-07-22 PROCEDURE — 88305 TISSUE EXAM BY PATHOLOGIST: CPT | Performed by: OBSTETRICS & GYNECOLOGY

## 2020-07-28 DIAGNOSIS — F34.1 DYSTHYMIA: ICD-10-CM

## 2020-07-28 DIAGNOSIS — F41.9 ANXIETY: ICD-10-CM

## 2020-07-29 DIAGNOSIS — I10 ESSENTIAL HYPERTENSION, BENIGN: ICD-10-CM

## 2020-07-29 RX ORDER — METOPROLOL TARTRATE 50 MG/1
TABLET, FILM COATED ORAL
Qty: 180 TABLET | Refills: 0 | OUTPATIENT
Start: 2020-07-29

## 2020-07-29 RX ORDER — ESCITALOPRAM OXALATE 20 MG/1
TABLET ORAL
Qty: 90 TABLET | Refills: 0 | OUTPATIENT
Start: 2020-07-29

## 2020-07-29 NOTE — TELEPHONE ENCOUNTER
LOV: 5/5/2020 with CHAD Esparza  RTC: 3 months  Last Relevant Labs: 12/31/19   Filled: 5/5/2020 #180 with 0 refills    Future Appointments   Date Time Provider Unruly Jean-Baptiste   1/13/2021 10:00 AM James Calderon MD [address] [place] [address]

## 2020-07-29 NOTE — TELEPHONE ENCOUNTER
LOV: 5/5/2020 with CHAD Murphy  RTC: 3 months  Last Relevant Labs: 12/31/2019   Filled: 5/5/2020  #90 with 0 refills    Future Appointments   Date Time Provider Unruly Jean-Baptiste   1/13/2021 10:00 AM Marianne Hatchet, MD [address] [place] [address]

## 2020-08-03 ENCOUNTER — LAB ENCOUNTER (OUTPATIENT)
Dept: LAB | Age: 55
End: 2020-08-03
Attending: FAMILY MEDICINE
Payer: COMMERCIAL

## 2020-08-03 DIAGNOSIS — E78.00 PURE HYPERCHOLESTEROLEMIA: ICD-10-CM

## 2020-08-03 DIAGNOSIS — R73.09 ELEVATED HEMOGLOBIN A1C: ICD-10-CM

## 2020-08-03 DIAGNOSIS — R79.89 LOW SERUM VITAMIN D: ICD-10-CM

## 2020-08-03 DIAGNOSIS — D64.9 ANEMIA, UNSPECIFIED TYPE: ICD-10-CM

## 2020-08-03 DIAGNOSIS — R74.01 ELEVATED AST (SGOT): ICD-10-CM

## 2020-08-03 LAB
AST SERPL-CCNC: 34 U/L (ref 15–37)
BASOPHILS # BLD AUTO: 0.04 X10(3) UL (ref 0–0.2)
BASOPHILS NFR BLD AUTO: 0.4 %
CHOLEST SMN-MCNC: 179 MG/DL (ref ?–200)
DEPRECATED RDW RBC AUTO: 46.4 FL (ref 35.1–46.3)
EOSINOPHIL # BLD AUTO: 0.33 X10(3) UL (ref 0–0.7)
EOSINOPHIL NFR BLD AUTO: 3.7 %
ERYTHROCYTE [DISTWIDTH] IN BLOOD BY AUTOMATED COUNT: 13.8 % (ref 11–15)
EST. AVERAGE GLUCOSE BLD GHB EST-MCNC: 128 MG/DL (ref 68–126)
HBA1C MFR BLD HPLC: 6.1 % (ref ?–5.7)
HCT VFR BLD AUTO: 39.5 % (ref 35–48)
HDLC SERPL-MCNC: 56 MG/DL (ref 40–59)
HGB BLD-MCNC: 12.4 G/DL (ref 12–16)
IMM GRANULOCYTES # BLD AUTO: 0.04 X10(3) UL (ref 0–1)
IMM GRANULOCYTES NFR BLD: 0.4 %
LDLC SERPL CALC-MCNC: 104 MG/DL (ref ?–100)
LYMPHOCYTES # BLD AUTO: 2.62 X10(3) UL (ref 1–4)
LYMPHOCYTES NFR BLD AUTO: 29.2 %
MCH RBC QN AUTO: 28.6 PG (ref 26–34)
MCHC RBC AUTO-ENTMCNC: 31.4 G/DL (ref 31–37)
MCV RBC AUTO: 91 FL (ref 80–100)
MONOCYTES # BLD AUTO: 0.65 X10(3) UL (ref 0.1–1)
MONOCYTES NFR BLD AUTO: 7.3 %
NEUTROPHILS # BLD AUTO: 5.28 X10 (3) UL (ref 1.5–7.7)
NEUTROPHILS # BLD AUTO: 5.28 X10(3) UL (ref 1.5–7.7)
NEUTROPHILS NFR BLD AUTO: 59 %
NONHDLC SERPL-MCNC: 123 MG/DL (ref ?–130)
PATIENT FASTING Y/N/NP: YES
PLATELET # BLD AUTO: 320 10(3)UL (ref 150–450)
RBC # BLD AUTO: 4.34 X10(6)UL (ref 3.8–5.3)
TRIGL SERPL-MCNC: 96 MG/DL (ref 30–149)
VIT D+METAB SERPL-MCNC: 32.7 NG/ML (ref 30–100)
VLDLC SERPL CALC-MCNC: 19 MG/DL (ref 0–30)
WBC # BLD AUTO: 9 X10(3) UL (ref 4–11)

## 2020-08-03 PROCEDURE — 83036 HEMOGLOBIN GLYCOSYLATED A1C: CPT | Performed by: FAMILY MEDICINE

## 2020-08-03 PROCEDURE — 80061 LIPID PANEL: CPT | Performed by: FAMILY MEDICINE

## 2020-08-03 PROCEDURE — 84450 TRANSFERASE (AST) (SGOT): CPT | Performed by: FAMILY MEDICINE

## 2020-08-03 PROCEDURE — 36415 COLL VENOUS BLD VENIPUNCTURE: CPT | Performed by: FAMILY MEDICINE

## 2020-08-03 PROCEDURE — 85025 COMPLETE CBC W/AUTO DIFF WBC: CPT | Performed by: NURSE PRACTITIONER

## 2020-08-03 PROCEDURE — 82306 VITAMIN D 25 HYDROXY: CPT | Performed by: FAMILY MEDICINE

## 2020-08-14 ENCOUNTER — HOSPITAL ENCOUNTER (OUTPATIENT)
Dept: MAMMOGRAPHY | Age: 55
Discharge: HOME | End: 2020-08-14
Attending: FAMILY MEDICINE
Payer: COMMERCIAL

## 2020-08-14 ENCOUNTER — TELEPHONE (OUTPATIENT)
Dept: INTERNAL MEDICINE CLINIC | Facility: CLINIC | Age: 55
End: 2020-08-14

## 2020-08-14 DIAGNOSIS — Z12.31 ENCOUNTER FOR SCREENING MAMMOGRAM FOR MALIGNANT NEOPLASM OF BREAST: ICD-10-CM

## 2020-08-14 DIAGNOSIS — Z12.31 ENCOUNTER FOR SCREENING MAMMOGRAM FOR MALIGNANT NEOPLASM OF BREAST: Primary | ICD-10-CM

## 2020-08-14 PROCEDURE — 77063 BREAST TOMOSYNTHESIS BI: CPT | Performed by: NURSE PRACTITIONER

## 2020-08-14 PROCEDURE — 77067 SCR MAMMO BI INCL CAD: CPT | Performed by: NURSE PRACTITIONER

## 2020-08-14 NOTE — TELEPHONE ENCOUNTER
Mammogram department [facility] called and they stated there is no order for pt's mammogram, pt has an appt today at 1:20pm, can a mammogram order be placed on the system prior to pt coming for her appt.

## 2020-08-14 NOTE — TELEPHONE ENCOUNTER
Last ov was a virtual completed in 5/5/20 re HTN. Last mammo completed in 8/28/2018 and normal.  Order pending. Please sign if ok to proceed w order.

## 2020-08-20 ENCOUNTER — OFFICE VISIT (OUTPATIENT)
Dept: INTERNAL MEDICINE CLINIC | Facility: CLINIC | Age: 55
End: 2020-08-20
Payer: COMMERCIAL

## 2020-08-20 VITALS
RESPIRATION RATE: 18 BRPM | HEIGHT: 65 IN | TEMPERATURE: 99 F | BODY MASS INDEX: 43.03 KG/M2 | HEART RATE: 78 BPM | DIASTOLIC BLOOD PRESSURE: 72 MMHG | WEIGHT: 258.25 LBS | SYSTOLIC BLOOD PRESSURE: 116 MMHG

## 2020-08-20 DIAGNOSIS — I10 ESSENTIAL HYPERTENSION, BENIGN: ICD-10-CM

## 2020-08-20 DIAGNOSIS — G89.29 CHRONIC PAIN OF RIGHT ANKLE: ICD-10-CM

## 2020-08-20 DIAGNOSIS — D64.9 ANEMIA, UNSPECIFIED TYPE: ICD-10-CM

## 2020-08-20 DIAGNOSIS — M54.9 MID BACK PAIN ON RIGHT SIDE: ICD-10-CM

## 2020-08-20 DIAGNOSIS — Z00.00 ROUTINE GENERAL MEDICAL EXAMINATION AT A HEALTH CARE FACILITY: Primary | ICD-10-CM

## 2020-08-20 DIAGNOSIS — F41.9 ANXIETY: ICD-10-CM

## 2020-08-20 DIAGNOSIS — F34.1 DYSTHYMIA: ICD-10-CM

## 2020-08-20 DIAGNOSIS — E78.00 PURE HYPERCHOLESTEROLEMIA: ICD-10-CM

## 2020-08-20 DIAGNOSIS — Z12.11 ENCOUNTER FOR SCREENING FECAL OCCULT BLOOD TESTING: ICD-10-CM

## 2020-08-20 DIAGNOSIS — M25.571 CHRONIC PAIN OF RIGHT ANKLE: ICD-10-CM

## 2020-08-20 DIAGNOSIS — L30.9 ECZEMA, UNSPECIFIED TYPE: ICD-10-CM

## 2020-08-20 PROCEDURE — 99214 OFFICE O/P EST MOD 30 MIN: CPT | Performed by: FAMILY MEDICINE

## 2020-08-20 PROCEDURE — 3074F SYST BP LT 130 MM HG: CPT | Performed by: FAMILY MEDICINE

## 2020-08-20 PROCEDURE — 99396 PREV VISIT EST AGE 40-64: CPT | Performed by: FAMILY MEDICINE

## 2020-08-20 PROCEDURE — 3078F DIAST BP <80 MM HG: CPT | Performed by: FAMILY MEDICINE

## 2020-08-20 PROCEDURE — 3008F BODY MASS INDEX DOCD: CPT | Performed by: FAMILY MEDICINE

## 2020-08-20 RX ORDER — METOPROLOL TARTRATE 50 MG/1
50 TABLET, FILM COATED ORAL 2 TIMES DAILY
Qty: 180 TABLET | Refills: 0 | Status: SHIPPED | OUTPATIENT
Start: 2020-08-20 | End: 2020-11-18

## 2020-08-20 RX ORDER — HYDROCODONE BITARTRATE AND ACETAMINOPHEN 5; 325 MG/1; MG/1
1 TABLET ORAL EVERY 6 HOURS PRN
Qty: 30 TABLET | Refills: 0 | Status: SHIPPED | OUTPATIENT
Start: 2020-08-20 | End: 2021-03-01

## 2020-08-20 RX ORDER — ATORVASTATIN CALCIUM 20 MG/1
20 TABLET, FILM COATED ORAL NIGHTLY
Qty: 90 TABLET | Refills: 0 | Status: SHIPPED | OUTPATIENT
Start: 2020-08-20 | End: 2020-12-18

## 2020-08-20 RX ORDER — ESCITALOPRAM OXALATE 20 MG/1
20 TABLET ORAL DAILY
Qty: 90 TABLET | Refills: 0 | Status: SHIPPED | OUTPATIENT
Start: 2020-08-20 | End: 2020-11-17

## 2020-08-20 RX ORDER — MELATONIN
325
Qty: 90 TABLET | Refills: 0 | Status: SHIPPED | OUTPATIENT
Start: 2020-08-20

## 2020-08-20 RX ORDER — ALPRAZOLAM 0.5 MG/1
TABLET ORAL
Qty: 30 TABLET | Refills: 0 | Status: SHIPPED | OUTPATIENT
Start: 2020-08-20 | End: 2021-03-01

## 2020-11-01 ENCOUNTER — OFFICE VISIT (OUTPATIENT)
Dept: SLEEP CENTER | Age: 55
End: 2020-11-01
Attending: INTERNAL MEDICINE
Payer: COMMERCIAL

## 2020-11-01 PROCEDURE — 95806 SLEEP STUDY UNATT&RESP EFFT: CPT

## 2020-11-02 ENCOUNTER — OFFICE VISIT (OUTPATIENT)
Dept: SLEEP CENTER | Age: 55
End: 2020-11-02
Attending: INTERNAL MEDICINE
Payer: COMMERCIAL

## 2020-11-02 PROCEDURE — 95806 SLEEP STUDY UNATT&RESP EFFT: CPT

## 2020-11-04 NOTE — PROCEDURES
[address]       Accredited by the [facility] of [facility] (AASM)    PATIENT'S NAME:        Zoë Ricketts  ATTENDING PHYSICIAN:   Franklin Ann M.D.   REFERRING PHYSICIAN:   Per Steward NP  MAGAN alcohol and sedating medications at bedtime, and engage in a formal weight loss program.  This patient should avoid driving while sleepy at all times.     Dictated By Bakari Copeland M.D.  d: 11/03/2020 17:57:39  t: 11/03/2020 18:52:00  [facility] [id number]

## 2020-11-17 DIAGNOSIS — F41.9 ANXIETY: ICD-10-CM

## 2020-11-17 DIAGNOSIS — F34.1 DYSTHYMIA: ICD-10-CM

## 2020-11-17 RX ORDER — ESCITALOPRAM OXALATE 20 MG/1
TABLET ORAL
Qty: 90 TABLET | Refills: 0 | Status: SHIPPED | OUTPATIENT
Start: 2020-11-17 | End: 2021-03-01

## 2020-11-18 DIAGNOSIS — I10 ESSENTIAL HYPERTENSION, BENIGN: ICD-10-CM

## 2020-11-18 RX ORDER — METOPROLOL TARTRATE 50 MG/1
TABLET, FILM COATED ORAL
Qty: 180 TABLET | Refills: 0 | Status: SHIPPED | OUTPATIENT
Start: 2020-11-18 | End: 2021-03-01

## 2020-11-18 NOTE — TELEPHONE ENCOUNTER
Passed protocol    Requesting Metoprolol Tartrate 50 MG Oral Tab  LOV: 8/20/20  RTC: NA  Last Relevant Labs: 8/3/20  Filled: 8/20/20 #180 with 0 refills    Future Appointments   Date Time Provider Unruly Jean-Baptiste   1/13/2021 10:00 AM Kishor Oliver MD W

## 2020-11-19 ENCOUNTER — ORDER TRANSCRIPTION (OUTPATIENT)
Dept: SLEEP CENTER | Age: 55
End: 2020-11-19

## 2020-11-19 DIAGNOSIS — Z11.59 SCREENING FOR VIRAL DISEASE: ICD-10-CM

## 2020-11-19 DIAGNOSIS — Z01.818 PREOP EXAMINATION: Primary | ICD-10-CM

## 2020-12-05 ENCOUNTER — APPOINTMENT (OUTPATIENT)
Dept: LAB | Age: 55
End: 2020-12-05
Attending: INTERNAL MEDICINE
Payer: COMMERCIAL

## 2020-12-05 DIAGNOSIS — Z01.818 PREOP EXAMINATION: ICD-10-CM

## 2020-12-05 DIAGNOSIS — Z11.59 SCREENING FOR VIRAL DISEASE: ICD-10-CM

## 2020-12-08 ENCOUNTER — OFFICE VISIT (OUTPATIENT)
Dept: SLEEP CENTER | Age: 55
End: 2020-12-08
Attending: INTERNAL MEDICINE
Payer: COMMERCIAL

## 2020-12-08 DIAGNOSIS — G47.33 OSA (OBSTRUCTIVE SLEEP APNEA): ICD-10-CM

## 2020-12-08 DIAGNOSIS — R53.83 FATIGUE, UNSPECIFIED TYPE: ICD-10-CM

## 2020-12-08 PROCEDURE — 95811 POLYSOM 6/>YRS CPAP 4/> PARM: CPT

## 2020-12-10 NOTE — PROCEDURES
[address] [place] [address]       Accredited by the [facility] of [facility] Medicine (AASM)    PATIENT'S NAME:        Thea Man  ATTENDING PHYSICIAN:   Marybeth Diaz M.D.   REFERRING PHYSICIAN:   Marybeth Diaz M.D. demonstrated sinus rhythm throughout. PERIODIC LIMB MOVEMENTS:  Periodic limb movements were not observed. EEG:  With a limited montage recorded, no EEG abnormalities were observed.     IMPRESSION:  Nasal CPAP titration was performed, and this pat

## 2020-12-12 ENCOUNTER — LAB ENCOUNTER (OUTPATIENT)
Dept: LAB | Age: 55
End: 2020-12-12
Attending: STUDENT IN AN ORGANIZED HEALTH CARE EDUCATION/TRAINING PROGRAM
Payer: COMMERCIAL

## 2020-12-12 DIAGNOSIS — Z01.818 PRE-OP TESTING: ICD-10-CM

## 2020-12-15 PROBLEM — Z12.11 SPECIAL SCREENING FOR MALIGNANT NEOPLASM OF COLON: Status: ACTIVE | Noted: 2020-12-15

## 2020-12-17 DIAGNOSIS — E78.00 PURE HYPERCHOLESTEROLEMIA: ICD-10-CM

## 2020-12-18 RX ORDER — ATORVASTATIN CALCIUM 20 MG/1
TABLET, FILM COATED ORAL
Qty: 90 TABLET | Refills: 0 | Status: SHIPPED | OUTPATIENT
Start: 2020-12-18 | End: 2021-03-01

## 2020-12-19 ENCOUNTER — OFFICE VISIT (OUTPATIENT)
Dept: INTERNAL MEDICINE CLINIC | Facility: CLINIC | Age: 55
End: 2020-12-19
Payer: COMMERCIAL

## 2020-12-19 ENCOUNTER — HOSPITAL ENCOUNTER (OUTPATIENT)
Dept: GENERAL RADIOLOGY | Age: 55
Discharge: HOME | End: 2020-12-19
Attending: FAMILY MEDICINE
Payer: COMMERCIAL

## 2020-12-19 VITALS
HEIGHT: 65 IN | RESPIRATION RATE: 14 BRPM | SYSTOLIC BLOOD PRESSURE: 120 MMHG | HEART RATE: 82 BPM | BODY MASS INDEX: 43.15 KG/M2 | DIASTOLIC BLOOD PRESSURE: 70 MMHG | TEMPERATURE: 99 F | WEIGHT: 259 LBS

## 2020-12-19 DIAGNOSIS — M25.512 LEFT SHOULDER PAIN, UNSPECIFIED CHRONICITY: ICD-10-CM

## 2020-12-19 DIAGNOSIS — L30.9 ECZEMA, UNSPECIFIED TYPE: ICD-10-CM

## 2020-12-19 DIAGNOSIS — L20.9 ATOPIC DERMATITIS, UNSPECIFIED TYPE: ICD-10-CM

## 2020-12-19 DIAGNOSIS — Z23 NEED FOR SHINGLES VACCINE: ICD-10-CM

## 2020-12-19 DIAGNOSIS — M25.512 LEFT SHOULDER PAIN, UNSPECIFIED CHRONICITY: Primary | ICD-10-CM

## 2020-12-19 PROCEDURE — 3074F SYST BP LT 130 MM HG: CPT | Performed by: FAMILY MEDICINE

## 2020-12-19 PROCEDURE — 90750 HZV VACC RECOMBINANT IM: CPT | Performed by: FAMILY MEDICINE

## 2020-12-19 PROCEDURE — 3078F DIAST BP <80 MM HG: CPT | Performed by: FAMILY MEDICINE

## 2020-12-19 PROCEDURE — 73030 X-RAY EXAM OF SHOULDER: CPT | Performed by: FAMILY MEDICINE

## 2020-12-19 PROCEDURE — 99214 OFFICE O/P EST MOD 30 MIN: CPT | Performed by: FAMILY MEDICINE

## 2020-12-19 PROCEDURE — 90471 IMMUNIZATION ADMIN: CPT | Performed by: FAMILY MEDICINE

## 2020-12-19 PROCEDURE — 3008F BODY MASS INDEX DOCD: CPT | Performed by: FAMILY MEDICINE

## 2020-12-19 RX ORDER — PREDNISONE 10 MG/1
TABLET ORAL
Qty: 20 TABLET | Refills: 0 | Status: SHIPPED | OUTPATIENT
Start: 2020-12-19 | End: 2021-01-20 | Stop reason: ALTCHOICE

## 2020-12-19 NOTE — PROGRESS NOTES
CHIEF COMPLAINT:     Patient presents with:  Pain: L shoulder pain, has been going on for about 2 months. Pt states it is worse with movement but also hurts if she isn't doing anything.  Pt states if she massages her shoulder it will help a little and at ni by mouth daily. • diphenhydrAMINE HCl (BENADRYL ALLERGY) 25 MG Oral Cap Take 25 mg by mouth every 6 (six) hours as needed for Sleep.         Past Medical History:   Diagnosis Date   • Anxiety    • Arthritis 9/2/2001    Ankle injury   • Atypical mole 05/ rhonchi. Breathing is non labored. CARDIO: RRR without murmur  EXTREMITIES: no cyanosis, clubbing or edema. SHOULDER: Left- joint pain, no swelling. Bicep pain also noted. Decreased ROM. LYMPH:  no lymphadenopathy.       Physical Exam    ASSESSMENT AND PL

## 2021-01-04 ENCOUNTER — TELEPHONE (OUTPATIENT)
Dept: INTERNAL MEDICINE CLINIC | Facility: CLINIC | Age: 56
End: 2021-01-04

## 2021-01-20 PROCEDURE — 87625 HPV TYPES 16 & 18 ONLY: CPT | Performed by: OBSTETRICS & GYNECOLOGY

## 2021-01-20 PROCEDURE — 88175 CYTOPATH C/V AUTO FLUID REDO: CPT | Performed by: OBSTETRICS & GYNECOLOGY

## 2021-01-20 PROCEDURE — 87624 HPV HI-RISK TYP POOLED RSLT: CPT | Performed by: OBSTETRICS & GYNECOLOGY

## 2021-02-15 DIAGNOSIS — L30.9 ECZEMA, UNSPECIFIED TYPE: ICD-10-CM

## 2021-02-15 PROBLEM — L20.9 ATOPIC DERMATITIS: Status: ACTIVE | Noted: 2021-02-15

## 2021-02-18 DIAGNOSIS — F34.1 DYSTHYMIA: ICD-10-CM

## 2021-02-18 DIAGNOSIS — I10 ESSENTIAL HYPERTENSION, BENIGN: ICD-10-CM

## 2021-02-18 DIAGNOSIS — F41.9 ANXIETY: ICD-10-CM

## 2021-02-18 RX ORDER — METOPROLOL TARTRATE 50 MG/1
TABLET, FILM COATED ORAL
Qty: 180 TABLET | Refills: 0 | OUTPATIENT
Start: 2021-02-18

## 2021-02-18 RX ORDER — ESCITALOPRAM OXALATE 20 MG/1
TABLET ORAL
Qty: 90 TABLET | Refills: 0 | OUTPATIENT
Start: 2021-02-18

## 2021-02-18 NOTE — TELEPHONE ENCOUNTER
Labs were done on 8-3-20, CMP was not ordered. Metoprolol 50 mg failed protocol due to  Hypertension Medications Protocol Isifyn9902/18/2021 12:31 AM   CMP or BMP in past 12 months   Filled 11-18-20  Qty 180  0 refills  No upcoming appt.    LOV 8-20-20

## 2021-03-01 ENCOUNTER — OFFICE VISIT (OUTPATIENT)
Dept: INTERNAL MEDICINE CLINIC | Facility: CLINIC | Age: 56
End: 2021-03-01
Payer: COMMERCIAL

## 2021-03-01 VITALS
HEART RATE: 62 BPM | SYSTOLIC BLOOD PRESSURE: 112 MMHG | HEIGHT: 65 IN | BODY MASS INDEX: 43.57 KG/M2 | RESPIRATION RATE: 16 BRPM | WEIGHT: 261.5 LBS | TEMPERATURE: 99 F | OXYGEN SATURATION: 99 % | DIASTOLIC BLOOD PRESSURE: 82 MMHG

## 2021-03-01 DIAGNOSIS — M25.512 LEFT SHOULDER PAIN, UNSPECIFIED CHRONICITY: ICD-10-CM

## 2021-03-01 DIAGNOSIS — F41.9 ANXIETY: ICD-10-CM

## 2021-03-01 DIAGNOSIS — I10 ESSENTIAL HYPERTENSION, BENIGN: Primary | ICD-10-CM

## 2021-03-01 DIAGNOSIS — G89.29 CHRONIC PAIN OF RIGHT ANKLE: ICD-10-CM

## 2021-03-01 DIAGNOSIS — M25.571 CHRONIC PAIN OF RIGHT ANKLE: ICD-10-CM

## 2021-03-01 DIAGNOSIS — F34.1 DYSTHYMIA: ICD-10-CM

## 2021-03-01 DIAGNOSIS — E78.00 PURE HYPERCHOLESTEROLEMIA: ICD-10-CM

## 2021-03-01 DIAGNOSIS — B35.9 TINEA: ICD-10-CM

## 2021-03-01 PROCEDURE — 3079F DIAST BP 80-89 MM HG: CPT | Performed by: FAMILY MEDICINE

## 2021-03-01 PROCEDURE — 3074F SYST BP LT 130 MM HG: CPT | Performed by: FAMILY MEDICINE

## 2021-03-01 PROCEDURE — 3008F BODY MASS INDEX DOCD: CPT | Performed by: FAMILY MEDICINE

## 2021-03-01 PROCEDURE — 99214 OFFICE O/P EST MOD 30 MIN: CPT | Performed by: FAMILY MEDICINE

## 2021-03-01 RX ORDER — METOPROLOL TARTRATE 50 MG/1
50 TABLET, FILM COATED ORAL 2 TIMES DAILY
Qty: 180 TABLET | Refills: 0 | Status: SHIPPED | OUTPATIENT
Start: 2021-03-01 | End: 2021-05-24

## 2021-03-01 RX ORDER — ATORVASTATIN CALCIUM 20 MG/1
20 TABLET, FILM COATED ORAL NIGHTLY
Qty: 90 TABLET | Refills: 0 | Status: SHIPPED | OUTPATIENT
Start: 2021-03-01 | End: 2021-05-24

## 2021-03-01 RX ORDER — ESCITALOPRAM OXALATE 20 MG/1
20 TABLET ORAL DAILY
Qty: 90 TABLET | Refills: 0 | Status: SHIPPED | OUTPATIENT
Start: 2021-03-01 | End: 2021-05-24

## 2021-03-01 RX ORDER — CLOTRIMAZOLE AND BETAMETHASONE DIPROPIONATE 10; .64 MG/G; MG/G
1 CREAM TOPICAL 2 TIMES DAILY PRN
Qty: 45 G | Refills: 0 | Status: SHIPPED | OUTPATIENT
Start: 2021-03-01

## 2021-03-01 RX ORDER — HYDROCODONE BITARTRATE AND ACETAMINOPHEN 5; 325 MG/1; MG/1
1 TABLET ORAL EVERY 6 HOURS PRN
Qty: 30 TABLET | Refills: 0 | Status: SHIPPED | OUTPATIENT
Start: 2021-03-01 | End: 2021-09-07

## 2021-03-01 RX ORDER — ALPRAZOLAM 0.5 MG/1
TABLET ORAL
Qty: 30 TABLET | Refills: 0 | Status: SHIPPED | OUTPATIENT
Start: 2021-03-01 | End: 2021-09-07

## 2021-03-01 NOTE — PROGRESS NOTES
CHIEF COMPLAINT:     Patient presents with:  Refill Request  Shoulder Pain: left shoulder pain. HPI:   Lashell Apt is a 63year old female   Patient presents for recheck of  Hypertension and hyperlipdemia.  Pt has been taking medications as ins Medication Sig Dispense Refill   • atorvastatin 20 MG Oral Tab Take 1 tablet (20 mg total) by mouth nightly. 90 tablet 0   • Metoprolol Tartrate 50 MG Oral Tab Take 1 tablet (50 mg total) by mouth 2 (two) times daily.  180 tablet 0   • escitalopram 20 MG hypertension    • Wears glasses       Social History:  Social History    Tobacco Use      Smoking status: Never Smoker      Smokeless tobacco: Never Used    Alcohol use: No    Drug use: No       REVIEW OF SYSTEMS:   GENERAL:  Denies fever, chills,weight ch chronicity  Tinea    No orders of the defined types were placed in this encounter.       Meds & Refills for this Visit:  Requested Prescriptions     Signed Prescriptions Disp Refills   • atorvastatin 20 MG Oral Tab 90 tablet 0     Sig: Take 1 tablet (20 mg Tab; Take 1 tablet (20 mg total) by mouth daily. Dispense: 90 tablet; Refill: 0  - ALPRAZolam 0.5 MG Oral Tab; TAKE 1 TABLET BY MOUTH 2 TIMES DAILY AS NEEDED FOR AXIETY  Dispense: 30 tablet; Refill: 0    5.  Chronic pain of right ankle  - stable, use medic

## 2021-03-01 NOTE — TELEPHONE ENCOUNTER
Patient called back and scheduled appointment for today at [place] [address] with Jairo Jordan.      Future Appointments   Date Time Provider Unruly Jean-Baptiste   3/1/2021  4:00 PM TEJ Avila [facility] [place]   7/22/2021  3:00 PM Felicia Thomas MD [facility]

## 2021-05-24 DIAGNOSIS — I10 ESSENTIAL HYPERTENSION, BENIGN: ICD-10-CM

## 2021-05-24 DIAGNOSIS — E78.00 PURE HYPERCHOLESTEROLEMIA: ICD-10-CM

## 2021-05-24 DIAGNOSIS — F41.9 ANXIETY: ICD-10-CM

## 2021-05-24 DIAGNOSIS — F34.1 DYSTHYMIA: ICD-10-CM

## 2021-05-24 RX ORDER — ATORVASTATIN CALCIUM 20 MG/1
TABLET, FILM COATED ORAL
Qty: 90 TABLET | Refills: 0 | Status: SHIPPED | OUTPATIENT
Start: 2021-05-24 | End: 2021-09-07

## 2021-05-24 RX ORDER — ESCITALOPRAM OXALATE 20 MG/1
TABLET ORAL
Qty: 90 TABLET | Refills: 0 | Status: SHIPPED | OUTPATIENT
Start: 2021-05-24 | End: 2021-09-07

## 2021-05-24 RX ORDER — METOPROLOL TARTRATE 50 MG/1
TABLET, FILM COATED ORAL
Qty: 180 TABLET | Refills: 0 | Status: SHIPPED | OUTPATIENT
Start: 2021-05-24 | End: 2021-09-07

## 2021-05-24 NOTE — TELEPHONE ENCOUNTER
Metoprolol 50 mg failed protocol   Hypertension Medications Protocol [id number]/24/2021 12:13 AM   CMP or BMP in past 12 months   Last OV relevant to medication: 3-1-21  Last refill date: 3-1-20 #/refills: 0  When pt was asked to return for OV: 8/2021  [employer]

## 2021-07-27 PROCEDURE — 88175 CYTOPATH C/V AUTO FLUID REDO: CPT | Performed by: OBSTETRICS & GYNECOLOGY

## 2021-07-27 PROCEDURE — 87624 HPV HI-RISK TYP POOLED RSLT: CPT | Performed by: OBSTETRICS & GYNECOLOGY

## 2021-08-19 DIAGNOSIS — F34.1 DYSTHYMIA: ICD-10-CM

## 2021-08-19 DIAGNOSIS — F41.9 ANXIETY: ICD-10-CM

## 2021-08-19 RX ORDER — ESCITALOPRAM OXALATE 20 MG/1
TABLET ORAL
Qty: 90 TABLET | Refills: 0 | OUTPATIENT
Start: 2021-08-19

## 2021-09-07 ENCOUNTER — OFFICE VISIT (OUTPATIENT)
Dept: INTERNAL MEDICINE CLINIC | Facility: CLINIC | Age: 56
End: 2021-09-07
Payer: COMMERCIAL

## 2021-09-07 VITALS
HEIGHT: 65 IN | BODY MASS INDEX: 43.99 KG/M2 | OXYGEN SATURATION: 99 % | WEIGHT: 264 LBS | SYSTOLIC BLOOD PRESSURE: 119 MMHG | HEART RATE: 74 BPM | TEMPERATURE: 99 F | RESPIRATION RATE: 12 BRPM | DIASTOLIC BLOOD PRESSURE: 51 MMHG

## 2021-09-07 DIAGNOSIS — G89.29 CHRONIC PAIN OF RIGHT ANKLE: ICD-10-CM

## 2021-09-07 DIAGNOSIS — E55.9 VITAMIN D DEFICIENCY: ICD-10-CM

## 2021-09-07 DIAGNOSIS — Z00.00 LABORATORY EXAMINATION ORDERED AS PART OF A ROUTINE GENERAL MEDICAL EXAMINATION: ICD-10-CM

## 2021-09-07 DIAGNOSIS — F34.1 DYSTHYMIA: ICD-10-CM

## 2021-09-07 DIAGNOSIS — M25.571 CHRONIC PAIN OF RIGHT ANKLE: ICD-10-CM

## 2021-09-07 DIAGNOSIS — L30.9 ECZEMA, UNSPECIFIED TYPE: ICD-10-CM

## 2021-09-07 DIAGNOSIS — M25.511 ACUTE PAIN OF RIGHT SHOULDER: ICD-10-CM

## 2021-09-07 DIAGNOSIS — F41.9 ANXIETY: ICD-10-CM

## 2021-09-07 DIAGNOSIS — I10 ESSENTIAL HYPERTENSION, BENIGN: Primary | ICD-10-CM

## 2021-09-07 DIAGNOSIS — E78.00 PURE HYPERCHOLESTEROLEMIA: ICD-10-CM

## 2021-09-07 PROCEDURE — 3008F BODY MASS INDEX DOCD: CPT | Performed by: FAMILY MEDICINE

## 2021-09-07 PROCEDURE — 3078F DIAST BP <80 MM HG: CPT | Performed by: FAMILY MEDICINE

## 2021-09-07 PROCEDURE — 3074F SYST BP LT 130 MM HG: CPT | Performed by: FAMILY MEDICINE

## 2021-09-07 PROCEDURE — 99214 OFFICE O/P EST MOD 30 MIN: CPT | Performed by: FAMILY MEDICINE

## 2021-09-07 RX ORDER — METOPROLOL TARTRATE 50 MG/1
50 TABLET, FILM COATED ORAL 2 TIMES DAILY
Qty: 180 TABLET | Refills: 0 | Status: SHIPPED | OUTPATIENT
Start: 2021-09-07 | End: 2021-11-29

## 2021-09-07 RX ORDER — ALPRAZOLAM 0.5 MG/1
TABLET ORAL
Qty: 30 TABLET | Refills: 0 | Status: SHIPPED | OUTPATIENT
Start: 2021-09-07

## 2021-09-07 RX ORDER — HYDROCODONE BITARTRATE AND ACETAMINOPHEN 5; 325 MG/1; MG/1
1 TABLET ORAL EVERY 6 HOURS PRN
Qty: 30 TABLET | Refills: 0 | Status: SHIPPED | OUTPATIENT
Start: 2021-09-07

## 2021-09-07 RX ORDER — ATORVASTATIN CALCIUM 20 MG/1
20 TABLET, FILM COATED ORAL NIGHTLY
Qty: 90 TABLET | Refills: 0 | Status: SHIPPED | OUTPATIENT
Start: 2021-09-07 | End: 2021-12-20

## 2021-09-07 RX ORDER — IBUPROFEN 800 MG/1
800 TABLET ORAL 3 TIMES DAILY
Qty: 90 TABLET | Refills: 0 | Status: SHIPPED | OUTPATIENT
Start: 2021-09-07 | End: 2021-09-29

## 2021-09-07 RX ORDER — ESCITALOPRAM OXALATE 20 MG/1
20 TABLET ORAL DAILY
Qty: 90 TABLET | Refills: 0 | Status: SHIPPED | OUTPATIENT
Start: 2021-09-07 | End: 2021-11-29

## 2021-09-07 NOTE — PROGRESS NOTES
CHIEF COMPLAINT:     Patient presents with:  Medication Follow-Up      HPI:   Juanito Alexander is a 61year old female   Patient presents for recheck of  Hypertension and hyperlipdemia.  Pt has been taking medications as instructed, no medication side eff ATORVASTATIN 20 MG Oral Tab TAKE 1 TABLET BY MOUTH EVERY DAY AT NIGHT 90 tablet 0   • ALPRAZolam 0.5 MG Oral Tab TAKE 1 TABLET BY MOUTH 2 TIMES DAILY AS NEEDED FOR AXIETY 30 tablet 0   • HYDROcodone-acetaminophen (NORCO) 5-325 MG Oral Tab Take 1 tablet by Use: Never used    Alcohol use: No    Drug use: No       REVIEW OF SYSTEMS:   GENERAL:  Denies fever, chills,weight change, decreased appetite and weakness. SKIN: Denies rashes, skin wounds or ulcers. + eczema  EYES: Denies blurred vision or double vision TSH W Reflex To Free T4      Vitamin D, 25-Hydroxy      Meds & Refills for this Visit:  Requested Prescriptions     Signed Prescriptions Disp Refills   • ALPRAZolam 0.5 MG Oral Tab 30 tablet 0     Sig: TAKE 1 TABLET BY MOUTH 2 TIMES DAILY AS NEEDED FOR AXI medication. Diet and exercise explained and encouraged. Fasting labs and Px due. Home blood pressure monitoring. Pt should measure BP’s two to three times per week. Goal blood pressure at home - < 135/85.     - metoprolol tartrate 50 MG Oral Tab;  Take 1

## 2021-09-09 ENCOUNTER — TELEPHONE (OUTPATIENT)
Dept: INTERNAL MEDICINE CLINIC | Facility: CLINIC | Age: 56
End: 2021-09-09

## 2021-09-29 DIAGNOSIS — M25.511 ACUTE PAIN OF RIGHT SHOULDER: ICD-10-CM

## 2021-09-29 DIAGNOSIS — G89.29 CHRONIC PAIN OF RIGHT ANKLE: ICD-10-CM

## 2021-09-29 DIAGNOSIS — M25.571 CHRONIC PAIN OF RIGHT ANKLE: ICD-10-CM

## 2021-09-29 RX ORDER — IBUPROFEN 800 MG/1
TABLET ORAL
Qty: 90 TABLET | Refills: 0 | Status: SHIPPED | OUTPATIENT
Start: 2021-09-29

## 2021-11-29 DIAGNOSIS — F34.1 DYSTHYMIA: ICD-10-CM

## 2021-11-29 DIAGNOSIS — I10 ESSENTIAL HYPERTENSION, BENIGN: ICD-10-CM

## 2021-11-29 DIAGNOSIS — F41.9 ANXIETY: ICD-10-CM

## 2021-11-29 RX ORDER — ESCITALOPRAM OXALATE 20 MG/1
20 TABLET ORAL DAILY
Qty: 90 TABLET | Refills: 0 | Status: SHIPPED | OUTPATIENT
Start: 2021-11-29 | End: 2022-02-21

## 2021-11-29 RX ORDER — METOPROLOL TARTRATE 50 MG/1
50 TABLET, FILM COATED ORAL 2 TIMES DAILY
Qty: 180 TABLET | Refills: 0 | Status: SHIPPED | OUTPATIENT
Start: 2021-11-29 | End: 2022-02-21

## 2021-11-29 NOTE — TELEPHONE ENCOUNTER
Metoprolol 50 mg failed protocol due to  Hypertension Medications Protocol Failed 11/29/2021 12:19 AM   Protocol Details  CMP or BMP in past 12 months   Filled 9-7-21  Qty 180  0 refills  No upcoming appt. LOV 9-7-21    Escitalopram 20 mg  Filled 9-7-21  Qty 90  0 refills  No upcoming appt.    LOV 9-7-21

## 2021-12-10 DIAGNOSIS — E78.00 PURE HYPERCHOLESTEROLEMIA: ICD-10-CM

## 2021-12-11 RX ORDER — ATORVASTATIN CALCIUM 20 MG/1
TABLET, FILM COATED ORAL
Qty: 90 TABLET | Refills: 0 | OUTPATIENT
Start: 2021-12-11

## 2021-12-11 NOTE — TELEPHONE ENCOUNTER
Sent [employer] message stating pt is over due for lab work.      Atorvastatin 20 mg failed protocol due to  Cholesterol Medication Protocol Failed 12/10/2021 12:18 AM   Protocol Details  ALT < 80    ALT resulted within past year    Lipid panel within past 12

## 2021-12-17 ENCOUNTER — LAB ENCOUNTER (OUTPATIENT)
Dept: LAB | Age: 56
End: 2021-12-17
Attending: FAMILY MEDICINE
Payer: COMMERCIAL

## 2021-12-17 DIAGNOSIS — E55.9 VITAMIN D DEFICIENCY: ICD-10-CM

## 2021-12-17 DIAGNOSIS — Z00.00 LABORATORY EXAMINATION ORDERED AS PART OF A ROUTINE GENERAL MEDICAL EXAMINATION: ICD-10-CM

## 2021-12-17 PROCEDURE — 83036 HEMOGLOBIN GLYCOSYLATED A1C: CPT | Performed by: FAMILY MEDICINE

## 2021-12-17 PROCEDURE — 82306 VITAMIN D 25 HYDROXY: CPT | Performed by: FAMILY MEDICINE

## 2021-12-17 PROCEDURE — 80061 LIPID PANEL: CPT | Performed by: FAMILY MEDICINE

## 2021-12-17 PROCEDURE — 80050 GENERAL HEALTH PANEL: CPT | Performed by: FAMILY MEDICINE

## 2021-12-20 ENCOUNTER — PATIENT MESSAGE (OUTPATIENT)
Dept: INTERNAL MEDICINE CLINIC | Facility: CLINIC | Age: 56
End: 2021-12-20

## 2021-12-20 DIAGNOSIS — E78.00 PURE HYPERCHOLESTEROLEMIA: ICD-10-CM

## 2021-12-20 RX ORDER — ATORVASTATIN CALCIUM 20 MG/1
20 TABLET, FILM COATED ORAL NIGHTLY
Qty: 90 TABLET | Refills: 0 | Status: SHIPPED | OUTPATIENT
Start: 2021-12-20

## 2021-12-20 NOTE — TELEPHONE ENCOUNTER
Taylor Gauthier LPN 10/86/0670 6:25 PM CST      ----- Message -----  From: Donnie Awad  Sent: 12/20/2021 3:15 PM CST  To: [facility] Clinical Staff  Subject: Need a prescription refilled     Zackery Jones, I did get my blood work done.  I know you’re going t

## 2021-12-21 DIAGNOSIS — E78.00 PURE HYPERCHOLESTEROLEMIA: ICD-10-CM

## 2021-12-21 RX ORDER — ATORVASTATIN CALCIUM 20 MG/1
20 TABLET, FILM COATED ORAL NIGHTLY
Qty: 90 TABLET | Refills: 0 | OUTPATIENT
Start: 2021-12-21

## 2021-12-22 ENCOUNTER — TELEPHONE (OUTPATIENT)
Dept: INTERNAL MEDICINE CLINIC | Facility: CLINIC | Age: 56
End: 2021-12-22

## 2021-12-22 DIAGNOSIS — E78.00 PURE HYPERCHOLESTEROLEMIA: Primary | ICD-10-CM

## 2021-12-22 DIAGNOSIS — R74.8 ELEVATED LIVER ENZYMES: ICD-10-CM

## 2021-12-22 DIAGNOSIS — D72.829 LEUKOCYTOSIS, UNSPECIFIED TYPE: ICD-10-CM

## 2021-12-22 DIAGNOSIS — R73.09 ELEVATED HEMOGLOBIN A1C: ICD-10-CM

## 2021-12-22 NOTE — TELEPHONE ENCOUNTER
----- Message from TEJ Corbett sent at 12/19/2021 11:43 AM CST -----  Hgba1c back up in the diabetic range. Pt to work on her diet, loosing weight and exercise. Recheck in 6 months. Liver enzymes up a little.   Pt to watch the Tylenol,Motrin ,and al

## 2022-01-07 ENCOUNTER — HOSPITAL ENCOUNTER (OUTPATIENT)
Dept: MAMMOGRAPHY | Age: 57
Discharge: HOME | End: 2022-01-07
Attending: OBSTETRICS & GYNECOLOGY
Payer: COMMERCIAL

## 2022-01-07 DIAGNOSIS — Z12.31 ENCOUNTER FOR SCREENING MAMMOGRAM FOR BREAST CANCER: ICD-10-CM

## 2022-01-07 PROCEDURE — 77063 BREAST TOMOSYNTHESIS BI: CPT | Performed by: OBSTETRICS & GYNECOLOGY

## 2022-01-07 PROCEDURE — 77067 SCR MAMMO BI INCL CAD: CPT | Performed by: OBSTETRICS & GYNECOLOGY

## 2022-01-25 NOTE — TELEPHONE ENCOUNTER
Ibuprofen 800 mg  Filled 9-7-21  Qty 90  0 refills  No upcoming appt.    LOV 9-7-21 chest wall non-tender,breathing is unlabored without accessory muscle use,normal breath sounds

## 2022-02-20 DIAGNOSIS — I10 ESSENTIAL HYPERTENSION, BENIGN: ICD-10-CM

## 2022-02-20 DIAGNOSIS — F34.1 DYSTHYMIA: ICD-10-CM

## 2022-02-20 DIAGNOSIS — F41.9 ANXIETY: ICD-10-CM

## 2022-02-21 RX ORDER — ESCITALOPRAM OXALATE 20 MG/1
20 TABLET ORAL DAILY
Qty: 90 TABLET | Refills: 0 | Status: SHIPPED | OUTPATIENT
Start: 2022-02-21 | End: 2022-06-10

## 2022-02-21 RX ORDER — METOPROLOL TARTRATE 50 MG/1
50 TABLET, FILM COATED ORAL 2 TIMES DAILY
Qty: 180 TABLET | Refills: 0 | Status: SHIPPED | OUTPATIENT
Start: 2022-02-21 | End: 2022-06-10

## 2022-02-21 NOTE — TELEPHONE ENCOUNTER
Sent [employer] message (Active 2/17/22) stating pt came due for repeat lab work last month. Metoprolol 50 mg  Filled 11-29-21  Qty 180  0 refills  No upcoming appt. LOV 9-7-21    Escitalopram 20 mg  Filled 11-29-21  Qty 90  0 refills  No upcoming appt.    LOV 9-7-21

## 2022-03-14 RX ORDER — ATORVASTATIN CALCIUM 20 MG/1
TABLET, FILM COATED ORAL
Qty: 90 TABLET | Refills: 1 | Status: SHIPPED | OUTPATIENT
Start: 2022-03-14

## 2022-03-14 NOTE — TELEPHONE ENCOUNTER
Atorvastatin 20 mg  Filled 12-20-21  Qty 90  0 refills  No upcoming appt.   LOV 9-7-21  Labs: 12-17-21: Lipid

## 2022-03-23 ENCOUNTER — LAB ENCOUNTER (OUTPATIENT)
Dept: LAB | Age: 57
End: 2022-03-23
Attending: FAMILY MEDICINE
Payer: COMMERCIAL

## 2022-03-23 DIAGNOSIS — D72.829 LEUKOCYTOSIS, UNSPECIFIED TYPE: ICD-10-CM

## 2022-03-23 DIAGNOSIS — R74.8 ELEVATED LIVER ENZYMES: ICD-10-CM

## 2022-03-23 LAB
ALBUMIN SERPL-MCNC: 3.4 G/DL (ref 3.4–5)
ALBUMIN/GLOB SERPL: 0.9 {RATIO} (ref 1–2)
ALP LIVER SERPL-CCNC: 106 U/L
ALT SERPL-CCNC: 41 U/L
ANION GAP SERPL CALC-SCNC: 6 MMOL/L (ref 0–18)
AST SERPL-CCNC: 36 U/L (ref 15–37)
BASOPHILS # BLD AUTO: 0.04 X10(3) UL (ref 0–0.2)
BASOPHILS NFR BLD AUTO: 0.4 %
BILIRUB SERPL-MCNC: 0.3 MG/DL (ref 0.1–2)
BUN BLD-MCNC: 14 MG/DL (ref 7–18)
CALCIUM BLD-MCNC: 9.5 MG/DL (ref 8.5–10.1)
CHLORIDE SERPL-SCNC: 108 MMOL/L (ref 98–112)
CO2 SERPL-SCNC: 27 MMOL/L (ref 21–32)
EOSINOPHIL # BLD AUTO: 0.35 X10(3) UL (ref 0–0.7)
EOSINOPHIL NFR BLD AUTO: 3.5 %
ERYTHROCYTE [DISTWIDTH] IN BLOOD BY AUTOMATED COUNT: 14.3 %
FASTING STATUS PATIENT QL REPORTED: YES
GLOBULIN PLAS-MCNC: 3.9 G/DL (ref 2.8–4.4)
GLUCOSE BLD-MCNC: 127 MG/DL (ref 70–99)
HCT VFR BLD AUTO: 38.5 %
HGB BLD-MCNC: 12 G/DL
IMM GRANULOCYTES # BLD AUTO: 0.06 X10(3) UL (ref 0–1)
IMM GRANULOCYTES NFR BLD: 0.6 %
LYMPHOCYTES # BLD AUTO: 3.08 X10(3) UL (ref 1–4)
LYMPHOCYTES NFR BLD AUTO: 31.2 %
MCH RBC QN AUTO: 28 PG (ref 26–34)
MCHC RBC AUTO-ENTMCNC: 31.2 G/DL (ref 31–37)
MCV RBC AUTO: 89.7 FL
MONOCYTES # BLD AUTO: 0.6 X10(3) UL (ref 0.1–1)
MONOCYTES NFR BLD AUTO: 6.1 %
NEUTROPHILS # BLD AUTO: 5.74 X10 (3) UL (ref 1.5–7.7)
NEUTROPHILS # BLD AUTO: 5.74 X10(3) UL (ref 1.5–7.7)
NEUTROPHILS NFR BLD AUTO: 58.2 %
OSMOLALITY SERPL CALC.SUM OF ELEC: 294 MOSM/KG (ref 275–295)
PLATELET # BLD AUTO: 355 10(3)UL (ref 150–450)
POTASSIUM SERPL-SCNC: 4.2 MMOL/L (ref 3.5–5.1)
PROT SERPL-MCNC: 7.3 G/DL (ref 6.4–8.2)
RBC # BLD AUTO: 4.29 X10(6)UL
SODIUM SERPL-SCNC: 141 MMOL/L (ref 136–145)
WBC # BLD AUTO: 9.9 X10(3) UL (ref 4–11)

## 2022-03-23 PROCEDURE — 80053 COMPREHEN METABOLIC PANEL: CPT | Performed by: FAMILY MEDICINE

## 2022-03-23 PROCEDURE — 85025 COMPLETE CBC W/AUTO DIFF WBC: CPT | Performed by: FAMILY MEDICINE

## 2022-05-16 ENCOUNTER — HOSPITAL ENCOUNTER (OUTPATIENT)
Age: 57
Discharge: HOME | End: 2022-05-16
Payer: COMMERCIAL

## 2022-05-16 ENCOUNTER — APPOINTMENT (OUTPATIENT)
Dept: GENERAL RADIOLOGY | Age: 57
End: 2022-05-16
Attending: NURSE PRACTITIONER
Payer: COMMERCIAL

## 2022-05-16 VITALS
HEIGHT: 65 IN | SYSTOLIC BLOOD PRESSURE: 112 MMHG | RESPIRATION RATE: 18 BRPM | WEIGHT: 255 LBS | HEART RATE: 60 BPM | DIASTOLIC BLOOD PRESSURE: 54 MMHG | TEMPERATURE: 98 F | OXYGEN SATURATION: 96 % | BODY MASS INDEX: 42.49 KG/M2

## 2022-05-16 DIAGNOSIS — B34.9 VIRAL SYNDROME: ICD-10-CM

## 2022-05-16 DIAGNOSIS — M79.672 LEFT FOOT PAIN: Primary | ICD-10-CM

## 2022-05-16 LAB — SARS-COV-2 RNA RESP QL NAA+PROBE: NOT DETECTED

## 2022-05-16 PROCEDURE — 99213 OFFICE O/P EST LOW 20 MIN: CPT

## 2022-05-16 PROCEDURE — 73630 X-RAY EXAM OF FOOT: CPT | Performed by: NURSE PRACTITIONER

## 2022-05-16 RX ORDER — NAPROXEN 500 MG/1
500 TABLET ORAL 2 TIMES DAILY PRN
Qty: 20 TABLET | Refills: 0 | Status: SHIPPED | OUTPATIENT
Start: 2022-05-16 | End: 2022-05-23

## 2022-05-16 RX ORDER — HYDROCODONE BITARTRATE AND ACETAMINOPHEN 5; 325 MG/1; MG/1
1 TABLET ORAL EVERY 6 HOURS PRN
Qty: 15 TABLET | Refills: 0 | Status: SHIPPED | OUTPATIENT
Start: 2022-05-16 | End: 2022-05-21

## 2022-05-16 NOTE — ED INITIAL ASSESSMENT (HPI)
Left foot- pt had a foot massage Saturday. notd bruise this morning   Fever temp 101 yesterday.  With bodyaches Pt requesting covid test

## 2022-06-05 DIAGNOSIS — I10 ESSENTIAL HYPERTENSION, BENIGN: ICD-10-CM

## 2022-06-06 NOTE — TELEPHONE ENCOUNTER
Could not leave vm, memory full. Pt is over due for BP f/u. Metoprolol 50 mg  Hypertension Medications Protocol Failed 06/05/2022 12:18 AM   Protocol Details  Appointment in past 6 or next 3 months   Filled 2-21-22  Qty 180  0 refills  No upcoming appt.    LOV 9-7-21

## 2022-06-10 DIAGNOSIS — I10 ESSENTIAL HYPERTENSION, BENIGN: ICD-10-CM

## 2022-06-10 DIAGNOSIS — F34.1 DYSTHYMIA: ICD-10-CM

## 2022-06-10 DIAGNOSIS — F41.9 ANXIETY: ICD-10-CM

## 2022-06-11 ENCOUNTER — TELEPHONE (OUTPATIENT)
Dept: FAMILY MEDICINE CLINIC | Facility: CLINIC | Age: 57
End: 2022-06-11

## 2022-06-11 DIAGNOSIS — F41.9 ANXIETY: ICD-10-CM

## 2022-06-11 DIAGNOSIS — F34.1 DYSTHYMIA: ICD-10-CM

## 2022-06-11 RX ORDER — ESCITALOPRAM OXALATE 20 MG/1
20 TABLET ORAL DAILY
Qty: 30 TABLET | Refills: 0 | Status: SHIPPED | OUTPATIENT
Start: 2022-06-11 | End: 2022-06-11

## 2022-06-11 RX ORDER — METOPROLOL TARTRATE 50 MG/1
50 TABLET, FILM COATED ORAL 2 TIMES DAILY
Qty: 60 TABLET | Refills: 0 | Status: SHIPPED | OUTPATIENT
Start: 2022-06-11 | End: 2022-07-05

## 2022-06-11 RX ORDER — METOPROLOL TARTRATE 50 MG/1
50 TABLET, FILM COATED ORAL 2 TIMES DAILY
Qty: 180 TABLET | Refills: 0 | OUTPATIENT
Start: 2022-06-11

## 2022-06-11 RX ORDER — ESCITALOPRAM OXALATE 20 MG/1
20 TABLET ORAL DAILY
Qty: 30 TABLET | Refills: 0 | Status: SHIPPED | OUTPATIENT
Start: 2022-06-11

## 2022-06-11 NOTE — TELEPHONE ENCOUNTER
COVID infection, tested positive at [facility] today  Reviewed chart, co morbidities. Biggest risk is weight - last charted weight 250s  Reviewed options - paxlovid vs infusion. She is on several medications that may interact with paxlovid. We discussed possible risk or return of symptoms after treatment  Infusion might be better option - 62 yo with high BMI    Now after hours at the urgent care, but recommend heading to [facility] tomorrow morning  In mean time, PRN OTC meds to control symptoms. She is talking in full sentences, no cough during our conversation. Patient expressed understanding.

## 2022-06-12 ENCOUNTER — HOSPITAL ENCOUNTER (OUTPATIENT)
Age: 57
Discharge: HOME | End: 2022-06-12
Attending: EMERGENCY MEDICINE
Payer: COMMERCIAL

## 2022-06-12 VITALS
HEIGHT: 65 IN | RESPIRATION RATE: 17 BRPM | OXYGEN SATURATION: 96 % | TEMPERATURE: 99 F | WEIGHT: 250 LBS | SYSTOLIC BLOOD PRESSURE: 124 MMHG | BODY MASS INDEX: 41.65 KG/M2 | HEART RATE: 60 BPM | DIASTOLIC BLOOD PRESSURE: 66 MMHG

## 2022-06-12 DIAGNOSIS — U07.1 COVID-19: Primary | ICD-10-CM

## 2022-06-12 PROCEDURE — 99214 OFFICE O/P EST MOD 30 MIN: CPT

## 2022-06-12 RX ORDER — BEBTELOVIMAB 87.5 MG/ML
175 INJECTION, SOLUTION INTRAVENOUS ONCE
Status: COMPLETED | OUTPATIENT
Start: 2022-06-12 | End: 2022-06-12

## 2022-06-12 NOTE — ED INITIAL ASSESSMENT (HPI)
Patient presents to IC with c/o covid and requesting infusion. Test + covid at [facility] yesterday. Symptoms started on Thursday. Just returned from [place]. No fever. +aches,diarrhea and sinus congestion.

## 2022-06-13 ENCOUNTER — TELEPHONE (OUTPATIENT)
Dept: INTERNAL MEDICINE CLINIC | Facility: CLINIC | Age: 57
End: 2022-06-13

## 2022-06-13 ENCOUNTER — TELEPHONE (OUTPATIENT)
Dept: CASE MANAGEMENT | Age: 57
End: 2022-06-13

## 2022-06-13 NOTE — TELEPHONE ENCOUNTER
Pt wanted to let Randa know she tested positive for covid over the weekend and received the monoclonal antibody treatment.      JAKE Tolentino

## 2022-06-13 NOTE — TELEPHONE ENCOUNTER
Pt received MAB infusion at [address] [place] [address] on 6/12/22 for COVID-19. Please follow-up with pt for post-infusion assessment and home monitoring if needed. Thank you.

## 2022-06-14 RX ORDER — ESCITALOPRAM OXALATE 20 MG/1
20 TABLET ORAL DAILY
Qty: 90 TABLET | Refills: 0 | OUTPATIENT
Start: 2022-06-14

## 2022-06-14 NOTE — TELEPHONE ENCOUNTER
Received fax stating for Escitalopram 20 mg, needs to be 90 day per insurance or asking for alternative. Rx pending.

## 2022-07-05 DIAGNOSIS — I10 ESSENTIAL HYPERTENSION, BENIGN: ICD-10-CM

## 2022-07-05 RX ORDER — METOPROLOL TARTRATE 50 MG/1
50 TABLET, FILM COATED ORAL 2 TIMES DAILY
Qty: 60 TABLET | Refills: 0 | Status: SHIPPED | OUTPATIENT
Start: 2022-07-05

## 2022-07-05 RX ORDER — METOPROLOL TARTRATE 50 MG/1
TABLET, FILM COATED ORAL
Qty: 180 TABLET | Refills: 0 | OUTPATIENT
Start: 2022-07-05

## 2022-07-05 NOTE — TELEPHONE ENCOUNTER
Metoprolol 50 mg  Hypertension Medications Protocol Failed 07/05/2022 10:30 AM   Protocol Details  Appointment in past 6 or next 3 months   Filled 6-11-22  Qty 60  0 refills  No upcoming appt.   LOV 9-7-21

## 2022-07-09 NOTE — TELEPHONE ENCOUNTER
HR=82 bpm, EgS4=128.0 % lmtcb    Patient is due for Telephone visit for BP monitoring     1st - Please ask patient to monitor BP at home for a few days before telephone visit. 2. If she already monitors you can make appt for same day     3.  Make appt with [facility] or Radha Patrick for

## 2022-07-15 ENCOUNTER — LAB ENCOUNTER (OUTPATIENT)
Dept: LAB | Age: 57
End: 2022-07-15
Attending: FAMILY MEDICINE
Payer: COMMERCIAL

## 2022-07-15 DIAGNOSIS — R73.09 ELEVATED HEMOGLOBIN A1C: ICD-10-CM

## 2022-07-15 LAB
EST. AVERAGE GLUCOSE BLD GHB EST-MCNC: 137 MG/DL (ref 68–126)
HBA1C MFR BLD: 6.4 % (ref ?–5.7)

## 2022-07-15 PROCEDURE — 83036 HEMOGLOBIN GLYCOSYLATED A1C: CPT

## 2022-07-15 PROCEDURE — 36415 COLL VENOUS BLD VENIPUNCTURE: CPT

## 2022-09-07 DIAGNOSIS — E78.00 PURE HYPERCHOLESTEROLEMIA: ICD-10-CM

## 2022-09-07 RX ORDER — ATORVASTATIN CALCIUM 20 MG/1
20 TABLET, FILM COATED ORAL NIGHTLY
Qty: 90 TABLET | Refills: 0 | Status: SHIPPED | OUTPATIENT
Start: 2022-09-07 | End: 2022-12-09

## 2022-10-06 PROCEDURE — 87624 HPV HI-RISK TYP POOLED RSLT: CPT | Performed by: OBSTETRICS & GYNECOLOGY

## 2022-10-13 DIAGNOSIS — I10 ESSENTIAL HYPERTENSION, BENIGN: ICD-10-CM

## 2022-10-13 RX ORDER — METOPROLOL TARTRATE 50 MG/1
50 TABLET, FILM COATED ORAL 2 TIMES DAILY
Qty: 180 TABLET | Refills: 0 | Status: SHIPPED | OUTPATIENT
Start: 2022-10-13

## 2022-10-18 DIAGNOSIS — F41.9 ANXIETY: ICD-10-CM

## 2022-10-18 DIAGNOSIS — F34.1 DYSTHYMIA: ICD-10-CM

## 2022-10-18 RX ORDER — ESCITALOPRAM OXALATE 10 MG/1
TABLET ORAL
Qty: 90 TABLET | Refills: 0 | Status: SHIPPED | OUTPATIENT
Start: 2022-10-18

## 2022-10-24 PROCEDURE — 88305 TISSUE EXAM BY PATHOLOGIST: CPT | Performed by: OBSTETRICS & GYNECOLOGY

## 2022-10-24 PROCEDURE — 88342 IMHCHEM/IMCYTCHM 1ST ANTB: CPT | Performed by: OBSTETRICS & GYNECOLOGY

## 2022-11-03 PROCEDURE — 88305 TISSUE EXAM BY PATHOLOGIST: CPT | Performed by: OBSTETRICS & GYNECOLOGY

## 2022-11-03 PROCEDURE — 88342 IMHCHEM/IMCYTCHM 1ST ANTB: CPT | Performed by: OBSTETRICS & GYNECOLOGY

## 2022-11-11 ENCOUNTER — PATIENT MESSAGE (OUTPATIENT)
Dept: INTERNAL MEDICINE CLINIC | Facility: CLINIC | Age: 57
End: 2022-11-11

## 2022-11-18 NOTE — TELEPHONE ENCOUNTER
Received paperwork stating that patient needs an H&P as well as EKG    If patient is diabetic they will need A1c and Electrolytes     Patient surgery is on 12/20/2022 [address]    Fax # [phone]     Patient is in need of a pre-op appointment w/ PCP for clearance      please schedule patient for OV for Pre-op     Paperwork in TO upcoming appointments folder in [address] [place] [address]

## 2022-11-28 DIAGNOSIS — L30.9 ECZEMA, UNSPECIFIED TYPE: ICD-10-CM

## 2022-12-09 DIAGNOSIS — E78.00 PURE HYPERCHOLESTEROLEMIA: ICD-10-CM

## 2022-12-09 RX ORDER — ATORVASTATIN CALCIUM 20 MG/1
TABLET, FILM COATED ORAL
Qty: 90 TABLET | Refills: 0 | Status: SHIPPED | OUTPATIENT
Start: 2022-12-09

## 2022-12-14 ENCOUNTER — OFFICE VISIT (OUTPATIENT)
Dept: INTERNAL MEDICINE CLINIC | Facility: CLINIC | Age: 57
End: 2022-12-14
Payer: COMMERCIAL

## 2022-12-14 VITALS
SYSTOLIC BLOOD PRESSURE: 112 MMHG | TEMPERATURE: 99 F | DIASTOLIC BLOOD PRESSURE: 82 MMHG | WEIGHT: 265.19 LBS | OXYGEN SATURATION: 93 % | BODY MASS INDEX: 44.18 KG/M2 | HEIGHT: 65 IN | HEART RATE: 87 BPM

## 2022-12-14 DIAGNOSIS — I10 ESSENTIAL HYPERTENSION, BENIGN: ICD-10-CM

## 2022-12-14 DIAGNOSIS — Z01.818 PREOP EXAMINATION: Primary | ICD-10-CM

## 2022-12-14 DIAGNOSIS — G56.02 CARPAL TUNNEL SYNDROME OF LEFT WRIST: ICD-10-CM

## 2022-12-14 DIAGNOSIS — E66.01 MORBID OBESITY (HCC): ICD-10-CM

## 2022-12-14 PROCEDURE — 3008F BODY MASS INDEX DOCD: CPT | Performed by: FAMILY MEDICINE

## 2022-12-14 PROCEDURE — 3074F SYST BP LT 130 MM HG: CPT | Performed by: FAMILY MEDICINE

## 2022-12-14 PROCEDURE — 3079F DIAST BP 80-89 MM HG: CPT | Performed by: FAMILY MEDICINE

## 2022-12-14 PROCEDURE — 93000 ELECTROCARDIOGRAM COMPLETE: CPT | Performed by: FAMILY MEDICINE

## 2022-12-14 PROCEDURE — 99243 OFF/OP CNSLTJ NEW/EST LOW 30: CPT | Performed by: FAMILY MEDICINE

## 2022-12-14 RX ORDER — CHLORAL HYDRATE 500 MG
CAPSULE ORAL
COMMUNITY
Start: 2022-11-02

## 2022-12-14 RX ORDER — GLUCOSAMINE/D3/BOSWELLIA SERRA 1500MG-400
TABLET ORAL
COMMUNITY
Start: 2022-11-02

## 2022-12-18 ENCOUNTER — HOSPITAL ENCOUNTER (OUTPATIENT)
Age: 57
Discharge: HOME | End: 2022-12-18
Payer: COMMERCIAL

## 2022-12-18 VITALS
SYSTOLIC BLOOD PRESSURE: 156 MMHG | WEIGHT: 262 LBS | HEIGHT: 65 IN | HEART RATE: 69 BPM | RESPIRATION RATE: 20 BRPM | TEMPERATURE: 98 F | OXYGEN SATURATION: 97 % | DIASTOLIC BLOOD PRESSURE: 66 MMHG | BODY MASS INDEX: 43.65 KG/M2
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
## 2022-12-18 DIAGNOSIS — S41.152A DOG BITE OF LEFT UPPER EXTREMITY, INITIAL ENCOUNTER: Primary | ICD-10-CM

## 2022-12-18 PROCEDURE — 90471 IMMUNIZATION ADMIN: CPT

## 2022-12-18 PROCEDURE — 99213 OFFICE O/P EST LOW 20 MIN: CPT

## 2022-12-18 RX ORDER — AMOXICILLIN AND CLAVULANATE POTASSIUM 875; 125 MG/1; MG/1
1 TABLET, FILM COATED ORAL 2 TIMES DAILY
Qty: 6 TABLET | Refills: 0 | Status: SHIPPED | OUTPATIENT
Start: 2022-12-18 | End: 2022-12-21

## 2022-12-18 NOTE — DISCHARGE INSTRUCTIONS
Watch for signs of infection, if any redness starting antibiotics be rechecked    Inform your surgeon

## 2022-12-18 NOTE — ED INITIAL ASSESSMENT (HPI)
Pt was trying to get her 2 dogs  when they were play fighting and she got bit in the left forearm  Area is bruised and has some punctures.   She is supposed to have carpel tunnel surgery to that wrist on 12/20

## 2022-12-19 LAB
ATRIAL RATE: 59 BPM
P AXIS: 36 DEGREES
P-R INTERVAL: 186 MS
Q-T INTERVAL: 428 MS
QRS DURATION: 90 MS
QTC CALCULATION (BEZET): 423 MS
R AXIS: 63 DEGREES
T AXIS: 64 DEGREES
VENTRICULAR RATE: 59 BPM

## 2022-12-22 ENCOUNTER — TELEPHONE (OUTPATIENT)
Dept: INTERNAL MEDICINE CLINIC | Facility: CLINIC | Age: 57
End: 2022-12-22

## 2022-12-22 DIAGNOSIS — E78.00 PURE HYPERCHOLESTEROLEMIA: ICD-10-CM

## 2022-12-22 RX ORDER — ATORVASTATIN CALCIUM 20 MG/1
TABLET, FILM COATED ORAL
Qty: 90 TABLET | Refills: 0 | OUTPATIENT
Start: 2022-12-22

## 2022-12-22 RX ORDER — ATORVASTATIN CALCIUM 20 MG/1
20 TABLET, FILM COATED ORAL NIGHTLY
Qty: 90 TABLET | Refills: 0 | Status: SHIPPED | OUTPATIENT
Start: 2022-12-22

## 2022-12-22 NOTE — TELEPHONE ENCOUNTER
Patient went in to pharmacy to  prescription and was told she picked it up on 12/12/2022. Patient said she's looked everywhere in her house and can't find it. She is wondering if there is anyway we can send it again even if she has to pay for it. She is requesting 90 day supply to keep the 3 month cycle.

## 2023-01-01 NOTE — PROGRESS NOTES
PCICU Progress Note       Date: 23  Hospital Length of Stay: 9  Day of Surgery: 23  Type of Surgery: Left thoracotomy, end to end anastomosis repair of coarctation of aorta    Patient identifier:  Jimbo Best) is a 1 week old term male with post- diagnosis of coarctation of the aorta.     Key events:  1. :  Intubated and PGE  [facility]. : [facility]  3. : Repair of Coarctation of Aorta (end to end anastomosis)  4. : Extubation     5. : Dex off, vocal cord evaluation by ENT         24 hour events:   No acute events overnight. Hemodynamically stable on no cardiac meds and on room air. Feeds advanced to goal of 25 cc/hr, emesis x 1, otherwise tolerating well. Diuretics were discontinued. His fentanyl infusion was weaned off.    Primary Cardiologist: Dr. Oquendo    Vital  Vital Signs:  Last Value (24 Hour) 24 Hour Range   Temperature 99.9 °F (37.7 °C) (23 0800) Temp  Min: 97.2 °F (36.2 °C)  Max: 99.9 °F (37.7 °C)   Pulse 151 (23 1000) Pulse  Min: 104  Max: 185   Arterial   Blood Pressure (!) 101/56 (23 1000) Arterial Line BP  Min: 68/38  Max: 102/64   Non-Invasive  Blood Pressure  No data recorded   Central Venous Pressure 8 mmHg (23 1000) CVP (mmHg)  Min: 1 mmHg  Max: 22 mmHg   Respiratory (!) 65 (23 1000) Resp  Min: 31  Max: 91   SpO2 100 % (23 1000) SpO2  Min: 93 %  Max: 100 %   cNIRS  No data recorded  Cerebral Oximetry Right  Min: 72  Max: 93   rNIRS  Somatic Oximetry 1  Min: 91  Max: 91  No data recorded                 Dosing Weight: 4 kg (8 lb 13.1 oz) (2023  8:00 PM)  Weight: 4.3 kg (9 lb 7.7 oz) (23)    Intake/Output        0700   0659  0700   0659    I.V. (mL/kg) 185.56 (43.15) 10.04 (2.33)    NG/GT (mL/kg) 310 (72.09)     Total Intake(mL/kg) 495.56 (115.25) 10.04 (2.33)    Urine (mL/kg/hr) 297 (2.88) 46 (2.32)    Emesis (mL/kg/hr) 0 (0)     Stool (mL/kg/hr) 15 (0.15)      HPI:   Rosalee Reeves is a 63year old female who presents for a complete physical exam. Pt saw her gyne for her pap smear.   Symptoms: denies discharge, itching, burning or dysuria, is menopausal. Patient complains of a patch of eczema on her chest /br 01/01/2007 04/11/2019      Zoster Vaccine Live (Zostavax)                          10/06/2015     Menarche- 12 yr  PAP- 7/6/20  Any abnormal pap smears- yes, this years pap showed ASCUS and HPV.    Pregnancies- 2, Live births- 2  Mammogram- 8/14/20 Chest Tube (mL/kg/hr)      Total Output(mL/kg) 312 (72.56) 46 (10.7)    Net +183.56 -35.96          Emesis Occurrence 1 x                      ECMO Settings:   Last Value (24 Hour) 24 Hour Range   Flow   No data recorded   RPM   No data recorded   Sweep   No data recorded   Delta P   No data recorded   Venous Pressure      SvO2   No data recorded            Vent Settings:     Setting Last Value (24 Hour)   Mode     TV Set      TV Observed     PIP Set     PIP Observed     CHILDS Level    PEEP     RR Set     RR Observed     PS     MAP     ITime     FiO2     SpO2 100 % (01/23/23 1000)    Nitric Oxide      EtCO2          Oxygen Therapy:    Room Air              Lines, Drains, & Airways:    CVC Double Lumen 01/19/23 Left Femoral Clear Blue (Active)   Number of days: 4       Arterial Line 01/19/23 1 Right Radial (Active)   Number of days: 4        Extubation readiness discussed: Not Applicable - Patient currently extubated.  Urinary catheter necessity discussed: Not applicable - Brown previously removed  Central line necessity discussed: Hemodynamic monitoring and Fluid, blood products, vasoactive drug administration      VTE Score: Not measured  VTE treatments: Frequent positioning changes recommended     Medications:  Continuous Infusions:     Scheduled Meds:   • pediatric multivitamin (POLY-VI-SOL) oral solution 1 mL Per NG tube Q24H   • glycerin pediatric suppository 0.5 suppository Rectal Daily     PRN Meds:   • HYDROcodone-acetaminophen 7.5-325 MG/15ML solution 0.4 mg  0.1 mg/kg (Dosing Weight) Oral Q6H PRN   • acetaminophen (TYLENOL) 160 MG/5ML suspension 60.16 mg  15 mg/kg (Dosing Weight) Oral Q4H PRN       Nutrition:  Breast Milk - 20 oscar/oz - 25 ml every hour NG    Physical exam:  General:Awake, alert. Laying comfortably in bed.  HEENT: Normocephalic, atraumatic. Symmetric facial features. No dysmorphic features appreciated. Anterior fontanelles soft and flat. Eyes without drainage or erythema bilaterally. Sclera  TO AFFECTED AREA ON BREAST TWICE DAILY AS NEEDED 28 g 0   • ALPRAZolam 0.5 MG Oral Tab TAKE 1 TABLET BY MOUTH 2 TIMES DAILY AS NEEDED FOR AXIETY 30 tablet 0   • Metoprolol Tartrate 50 MG Oral Tab Take 1 tablet (50 mg total) by mouth 2 (two) times daily.  25 anicteric. No periorbital edema. No nasal discharge, moist and acyanotic mucous membranes.  Chest/Respiratory: Clear to auscultation with good aeration bilaterally throughout. Symmetrical chest rise. No adventitious breath sounds appreciated. Left chest thoracotomy incision well-approximated covered with dermabond.   Cardiovascular: Quiet precordium. No thrills, lifts, or other abnormal precordial impulses. Regular rate and rhythm with 2/6 murmur noted over LUSB. No, rub, click, or gallop appreciated.   Abdominal: Abdomen soft, non-distended, non-tender. Normoactive bowel sounds. Liver edge not palpated below the RCM.  Musculoskeletal/Extremities: Warm and well perfused with +2 bilateral femoral and brachial pulses and brisk capillary refill (< 2 sec) in all four extremities (LUE, RUE, LLE, RLE). No evidence of clubbing, cyanosis, or peripheral edema.     Neurological: Moves all extremities, EOM intact, tracks, and fixes. Symmetric facial expression and four extremity movement. Equal strength and ROM in all four extremities (RUE, LUE, RLE, LLE). No focal deficits appreciated. Appropriate tone.   Skin: Jaundiced. Skin warm, dry, and intact without any lesions, cyanosis, or generalized edema. Ecchymosis to left hand where PIV infiltrated.     Labs:    Metabolic Panel  Sodium 140 2023   Potassium 3.6 2023   Chloride 99 2023   CO2 30 2023   BUN 18 2023   Creatinine 0.52 2023   Glucose 101 2023   Calcium 8.2 2023   Magnesium 2.3 2023   Phosphorus 6.2 2023   Total Bilirubin 9.2 2023   AST/SGOT 45 2023   ALT/SGPT 8 2023   Alk Phosphatase 156 2023   Albumin 2.4 2023   Globulin 3.0 2023   Total Protein 5.4 2023     Complete Blood Count  WBC - -   HGB - -   HCT - -   PLT - -   Bands - -   Neutrophil - -   Lymphocyte - -   Monocyte - -   Eosinophil - -   Basophil - -   ANC - -   ALC - -        Arterial Blood Gas  pH 7.44 2023   pCO2 47  2023   pO2 74 2023   HCO3 32 2023   Base Excess 7 2023   O2 Sat 97 2023   Methemoglobin 0.9 2023   Lactic acid 1.4 2023   Calcium ionized 1.15 2023     Coagulation Profile  Protime - -   INR - -   PTT - -   Heparin Level (antiXa) - -   Fibrinogen - -   D-Dimer - -     Infectious Disease   C-Reative Protein - -   Procalcitonin - -      Endocrine (Last 30 Days)  TSH 6.785 2023   Free T3 - -   Free T4 - -         Pertinent Imaging:  • Last Chest Xray:        XR CHEST AP OR PA    Impression  No acute change in cardiopulmonary appearance after chest tube  removal.    Electronically Signed by: DORITA HAY M.D.  Signed on: 2023 10:06 AM            XR CHEST AND ABDOMEN CHILD 1 VIEW    Impression  No acute change in cardiopulmonary appearance.    Nonobstructive bowel gas pattern.    Electronically Signed by: DORITA HAY M.D.  Signed on: 2023 8:03 AM         XR CHEST AND ABDOMEN INFANT 1 VIEW    Impression  Mild decrease in lung expansion without other acute change in  cardiopulmonary appearance.    Nonobstructive bowel gas pattern.    Electronically Signed by: DORITA HAY M.D.  Signed on: 2023 7:23 AM       • Last ECG:  Encounter Date: 01/14/23   Electrocardiogram 12-Lead   Result Value    Ventricular Rate EKG/Min (BPM) 102    Atrial Rate (BPM) 102    GA-Interval (MSEC) 108    QRS-Interval (MSEC) 74    QT-Interval (MSEC) 384    QTc 500    P Axis (Degrees) 73    R Axis (Degrees) -144    T Axis (Degrees) 68    REPORT TEXT       Pediatric ECG analysis   Normal sinus rhythm  [place] axis  Right ventricular hypertrophy  Prolonged QT  Nonspecific ST-T wave changes, diffusely  Abnormal ECG  When compared with ECG of  2023 18:59,  no significant change  Confirmed by NERIS PARISI MD ([id number]) on 2023 12:05:55 PM          • Last Echocardiogram : 1/20/23  SUMMARY:     Ventricular function:  LV systolic function is subjectively normal.  Ejection fraction  Mother 72   • Cancer Mother    • Hypertension Sister    • Hypertension Brother    • Diabetes Brother    • Stroke Neg       Social History:   Social History    Tobacco Use      Smoking status: Never Smoker      Smokeless tobacco: Never Used    Alcohol use: 74%.     The right ventricle is mildly enlarged and in some views has mildly low systolic function due to mild anterior wall hypokinesis..     Pulmonary hypertension:  There is no evidence of significant pulmonary hypertension.  There is mild tricuspid insufficiency with gradient less than 20 mmHg.     Aortic arch:  Ascending aorta and proximal and transverse aortic arch are widely patent.  The region of coarctation repair appears widely patent.  The repair region has mildly turbulent flow and peak gradient 25-28 with mean gradient 12 to 15 mmHg.  It does not appear narrow on 2D echo.     Aortic valve:  Bicuspid aortic valve with right-non fusion.  Annulus 7.3 mm.  Peak gradient 30 and mean gradient 13 mmHg measured from the suprasternal notch.  No significant aortic valve insufficiency.     Compared to prior study ventricular function has improved, pulmonary hypertension has improved.    Last CT/MRI:   N/A        • Last Head Ultrasound:       US INFANT HEAD    Impression  Unremarkable cranial ultrasound.  No definite evidence of  hemorrhage is seen.    Electronically Signed by: LAUREN GROVE M.D.  Signed on: 2023 9:38 PM           Patient Active Problem List   Diagnosis   • LGA (large for gestational age) infant   • Respiratory distress of    • Heart murmur   • Congenital aortic stenosis   • Coarctation of aorta   • Bicuspid aortic valve             Assessment and Plan       Jimbo Damon ([facility]) is a 1 week old with  diagnosis of coarctation of the aorta, bicuspid aortic valve, dysplastic aortic valve, and pulmonary hypertension. He is now POD 4 () from a coarctation repair via end to end anastomosis. He has been hemodynamically stable since returning to [facility]. Tolerated extubation . He has been deescalated, no longer on cardiac drips, weaned off sedation, and advancing nutrition to bolus feeds. He will get an echocardiogram today while off all inotropes and drips to assess  sensory are grossly intact  PSYCH:  Speech, mood and affect are appropriate    ASSESSMENT AND PLAN:   Juanito Alexander is a 63year old female who presents for a complete physical exam. Self breast exam explained.  Health maintenance, will check fasting l breakfast.  Dispense: 90 tablet; Refill: 0    5. Chronic pain of right ankle  - stable, use Norco if Motrin or Aleve does not help.  - HYDROcodone-acetaminophen (NORCO) 5-325 MG Oral Tab; Take 1 tablet by mouth every 6 (six) hours as needed for Pain.   Disp for function and to evaluate aortic valve. Continue to monitor for pain.     Cardiovascular:  · Last echocardiogram  with gradient across the bicuspid aortic valve peak gradient of 30 and mean gradient of 13, normal function and now signs of pulmonary hypertension, Aortic arch with peak gradient of 25 to 28 with mean gradient of 12 to 15 mm Hg  · Will need discharge echo today off inotropic support to assess function   · Follow upper and lower extremity blood pressures    · Discontinue CVP monitoring  · Discharge EKG    Respiratory:   · Current support: none; on room air  · ENT consult today for vocal cord evaluation prior to PO attempt given arch repair       FEN:    · Advance diet from continuous feeds to bolus feeds of breast milk 75 ml q 3 hours  · Glycerin daily  · OFF all diuretics  · Polyvisol    Heme:   · No acute issues  · Continue heparin 10 units/kg/hr for thromboprophylaxis while central line in place    Endocrine:   · No acute issues  · Elevated TSH 6.785  · Resend  screen today     ID:   · No acute issues  · Follow-up blood and sputum culture sent    · Blood culture - NGTD  · Sputum Culture with Moderate Staphylococcus Epidermidis growth   · Consulted with ID, no treatment required    Neuro:     · Dexmedetomidine off today  · Hycet q6 PRN  · Tylenol PRN  · Discontinue Morphine  · Head US unremarkable    Renal   · Renal US unremarkable      Miscellaneous:   • Follow up microarray, sent    • Needs hearing screen prior to discharge   • Patient received hep B, vitamin K, and erythromycin eye ointment    • Wound care team on consult to evaluate and follow PIV infiltrate to left hand  o MIST therapy    Access:  · Left femoral double lumen central venous catheter inserted  - 4 days old  · Right radial arterial line inserted  -  days old    Remove LFA DL CVC and R radial arterial line today.     Disposition: Floor      Code Status: Full Resuscitation    ECMO Status:  Yes      Amanda Stephens, EDSON, AC PNP     I agree with the above note and plan as outlined by the APC student.     RADHA Adams    -----------------------------------------------------------------------------------    Due to a high probability of clinically significant, life-threatening deterioration, the patient required my highest level of preparedness to intervene emergently and I have personally spent the critical care time (excluding billable procedure time) directly and personally managing the patient. I agree with the note above.    Total Time: 45 min spent engaged in work directly related to patient care and/or available for direct patient care  Management: Bedside assessment and Supervision of care, Interpretation CXR, blood gases, ECG, blood pressure and cardiac output measures, Interventions hemodynamic mgmt  Teaching Attestation: Seen and examined with Team, Plan of Care Developed together (with team) and Parents updated and aware of Plan of Care, Documentation Developed together (with team)      Lisa Teixeira MD  Attending Physician, Pediatric Cardiac ICU  [facility]            PCICU Daily Note Version 3   • HYDROcodone-acetaminophen (NORCO) 5-325 MG Oral Tab 30 tablet 0     Sig: Take 1 tablet by mouth every 6 (six) hours as needed for Pain. • escitalopram 20 MG Oral Tab 90 tablet 0     Sig: Take 1 tablet (20 mg total) by mouth daily.    • atorvastatin 20

## 2023-01-05 DIAGNOSIS — I10 ESSENTIAL HYPERTENSION, BENIGN: ICD-10-CM

## 2023-01-05 RX ORDER — METOPROLOL TARTRATE 50 MG/1
50 TABLET, FILM COATED ORAL 2 TIMES DAILY
Qty: 60 TABLET | Refills: 0 | Status: SHIPPED | OUTPATIENT
Start: 2023-01-05 | End: 2023-01-09

## 2023-01-09 RX ORDER — METOPROLOL TARTRATE 50 MG/1
50 TABLET, FILM COATED ORAL 2 TIMES DAILY
Qty: 180 TABLET | Refills: 0 | Status: SHIPPED | OUTPATIENT
Start: 2023-01-09

## 2023-01-19 DIAGNOSIS — F41.9 ANXIETY: ICD-10-CM

## 2023-01-19 DIAGNOSIS — F34.1 DYSTHYMIA: ICD-10-CM

## 2023-01-19 RX ORDER — ESCITALOPRAM OXALATE 10 MG/1
TABLET ORAL
Qty: 90 TABLET | Refills: 0 | Status: SHIPPED | OUTPATIENT
Start: 2023-01-19

## 2023-02-10 ENCOUNTER — TELEMEDICINE (OUTPATIENT)
Dept: INTERNAL MEDICINE CLINIC | Facility: CLINIC | Age: 58
End: 2023-02-10
Payer: COMMERCIAL

## 2023-02-10 DIAGNOSIS — R19.7 DIARRHEA OF PRESUMED INFECTIOUS ORIGIN: Primary | ICD-10-CM

## 2023-02-10 PROCEDURE — 99213 OFFICE O/P EST LOW 20 MIN: CPT | Performed by: FAMILY MEDICINE

## 2023-02-10 RX ORDER — CIPROFLOXACIN 500 MG/1
500 TABLET, FILM COATED ORAL 2 TIMES DAILY
Qty: 14 TABLET | Refills: 0 | Status: SHIPPED | OUTPATIENT
Start: 2023-02-10

## 2023-02-10 RX ORDER — ONDANSETRON 4 MG/1
4 TABLET, FILM COATED ORAL EVERY 8 HOURS PRN
Qty: 20 TABLET | Refills: 0 | Status: SHIPPED | OUTPATIENT
Start: 2023-02-10

## 2023-03-23 ENCOUNTER — OFFICE VISIT (OUTPATIENT)
Dept: INTERNAL MEDICINE CLINIC | Facility: CLINIC | Age: 58
End: 2023-03-23
Payer: COMMERCIAL

## 2023-03-23 VITALS
TEMPERATURE: 99 F | DIASTOLIC BLOOD PRESSURE: 78 MMHG | BODY MASS INDEX: 43.76 KG/M2 | SYSTOLIC BLOOD PRESSURE: 130 MMHG | HEIGHT: 65 IN | RESPIRATION RATE: 12 BRPM | OXYGEN SATURATION: 99 % | WEIGHT: 262.63 LBS | HEART RATE: 61 BPM

## 2023-03-23 DIAGNOSIS — F41.9 ANXIETY: ICD-10-CM

## 2023-03-23 DIAGNOSIS — E55.9 VITAMIN D DEFICIENCY: ICD-10-CM

## 2023-03-23 DIAGNOSIS — G89.29 CHRONIC PAIN OF RIGHT ANKLE: ICD-10-CM

## 2023-03-23 DIAGNOSIS — I10 ESSENTIAL HYPERTENSION, BENIGN: Primary | ICD-10-CM

## 2023-03-23 DIAGNOSIS — M25.571 CHRONIC PAIN OF RIGHT ANKLE: ICD-10-CM

## 2023-03-23 DIAGNOSIS — E78.00 PURE HYPERCHOLESTEROLEMIA: ICD-10-CM

## 2023-03-23 DIAGNOSIS — Z00.00 LABORATORY EXAMINATION ORDERED AS PART OF A ROUTINE GENERAL MEDICAL EXAMINATION: ICD-10-CM

## 2023-03-23 DIAGNOSIS — I10 ESSENTIAL HYPERTENSION, BENIGN: ICD-10-CM

## 2023-03-23 DIAGNOSIS — Z23 NEED FOR SHINGLES VACCINE: ICD-10-CM

## 2023-03-23 DIAGNOSIS — F34.1 DYSTHYMIA: ICD-10-CM

## 2023-03-23 DIAGNOSIS — R73.09 ELEVATED HEMOGLOBIN A1C: ICD-10-CM

## 2023-03-23 PROCEDURE — 99214 OFFICE O/P EST MOD 30 MIN: CPT | Performed by: FAMILY MEDICINE

## 2023-03-23 PROCEDURE — 3008F BODY MASS INDEX DOCD: CPT | Performed by: FAMILY MEDICINE

## 2023-03-23 PROCEDURE — 90750 HZV VACC RECOMBINANT IM: CPT | Performed by: FAMILY MEDICINE

## 2023-03-23 PROCEDURE — 3078F DIAST BP <80 MM HG: CPT | Performed by: FAMILY MEDICINE

## 2023-03-23 PROCEDURE — 90471 IMMUNIZATION ADMIN: CPT | Performed by: FAMILY MEDICINE

## 2023-03-23 PROCEDURE — 3075F SYST BP GE 130 - 139MM HG: CPT | Performed by: FAMILY MEDICINE

## 2023-03-23 RX ORDER — PYRAZINAMIDE 500 MG/1
1 TABLET ORAL EVERY 6 HOURS PRN
Qty: 30 TABLET | Refills: 0 | Status: SHIPPED | OUTPATIENT
Start: 2023-03-23

## 2023-03-23 RX ORDER — ALPRAZOLAM 0.5 MG/1
TABLET ORAL
Qty: 30 TABLET | Refills: 0 | Status: SHIPPED | OUTPATIENT
Start: 2023-03-23

## 2023-03-24 NOTE — TELEPHONE ENCOUNTER
Pt was seen 3/24. Still needs her other prescriptions refilled as she only has enough for tonight.     -atorvastatin 20 MG Oral Tab.  -metoprolol tartrate 50 MG Oral Tab. -ESCITALOPRAM 10 MG Oral Tab.     [facility]/pharmacy #[id number] [place], [place] - [address] [facility], [phone], [phone] [address] [address]   Phone: [phone] Fax: [phone]

## 2023-03-25 RX ORDER — ATORVASTATIN CALCIUM 20 MG/1
TABLET, FILM COATED ORAL
Qty: 90 TABLET | Refills: 0 | Status: SHIPPED | OUTPATIENT
Start: 2023-03-25

## 2023-03-25 RX ORDER — ESCITALOPRAM OXALATE 10 MG/1
10 TABLET ORAL DAILY
Qty: 90 TABLET | Refills: 0 | Status: SHIPPED | OUTPATIENT
Start: 2023-03-25

## 2023-03-25 RX ORDER — METOPROLOL TARTRATE 50 MG/1
50 TABLET, FILM COATED ORAL 2 TIMES DAILY
Qty: 180 TABLET | Refills: 0 | Status: SHIPPED | OUTPATIENT
Start: 2023-03-25

## 2023-04-21 NOTE — TELEPHONE ENCOUNTER
Called patient's insurance to initiate prior authorization on Lansoprazole. States med not covered and should try OTC first.  Notified patient to try OTC Omeprazole or Esomeprazole per SM. [facility] aware. What Is The Patient's Gender: Male

## 2023-05-01 ENCOUNTER — LAB ENCOUNTER (OUTPATIENT)
Dept: LAB | Age: 58
End: 2023-05-01
Attending: FAMILY MEDICINE
Payer: COMMERCIAL

## 2023-05-01 DIAGNOSIS — I10 ESSENTIAL HYPERTENSION, BENIGN: ICD-10-CM

## 2023-05-01 DIAGNOSIS — Z00.00 LABORATORY EXAMINATION ORDERED AS PART OF A ROUTINE GENERAL MEDICAL EXAMINATION: ICD-10-CM

## 2023-05-01 DIAGNOSIS — E78.00 PURE HYPERCHOLESTEROLEMIA: ICD-10-CM

## 2023-05-01 DIAGNOSIS — E55.9 VITAMIN D DEFICIENCY: ICD-10-CM

## 2023-05-01 DIAGNOSIS — R73.09 ELEVATED HEMOGLOBIN A1C: ICD-10-CM

## 2023-05-01 LAB
ALBUMIN SERPL-MCNC: 3.4 G/DL (ref 3.4–5)
ALBUMIN/GLOB SERPL: 0.8 {RATIO} (ref 1–2)
ALP LIVER SERPL-CCNC: 106 U/L
ALT SERPL-CCNC: 42 U/L
ANION GAP SERPL CALC-SCNC: 7 MMOL/L (ref 0–18)
AST SERPL-CCNC: 39 U/L (ref 15–37)
BASOPHILS # BLD AUTO: 0.04 X10(3) UL (ref 0–0.2)
BASOPHILS NFR BLD AUTO: 0.4 %
BILIRUB SERPL-MCNC: 0.5 MG/DL (ref 0.1–2)
BUN BLD-MCNC: 13 MG/DL (ref 7–18)
CALCIUM BLD-MCNC: 9.8 MG/DL (ref 8.5–10.1)
CHLORIDE SERPL-SCNC: 105 MMOL/L (ref 98–112)
CHOLEST SERPL-MCNC: 180 MG/DL (ref ?–200)
CO2 SERPL-SCNC: 28 MMOL/L (ref 21–32)
CREAT BLD-MCNC: 0.8 MG/DL
EOSINOPHIL # BLD AUTO: 0.32 X10(3) UL (ref 0–0.7)
EOSINOPHIL NFR BLD AUTO: 3.1 %
ERYTHROCYTE [DISTWIDTH] IN BLOOD BY AUTOMATED COUNT: 14.6 %
FASTING PATIENT LIPID ANSWER: YES
FASTING STATUS PATIENT QL REPORTED: YES
GFR SERPLBLD BASED ON 1.73 SQ M-ARVRAT: 85 ML/MIN/1.73M2 (ref 60–?)
GLOBULIN PLAS-MCNC: 4.5 G/DL (ref 2.8–4.4)
GLUCOSE BLD-MCNC: 98 MG/DL (ref 70–99)
HCT VFR BLD AUTO: 37.8 %
HDLC SERPL-MCNC: 69 MG/DL (ref 40–59)
HGB BLD-MCNC: 12.1 G/DL
IMM GRANULOCYTES # BLD AUTO: 0.07 X10(3) UL (ref 0–1)
IMM GRANULOCYTES NFR BLD: 0.7 %
LDLC SERPL CALC-MCNC: 93 MG/DL (ref ?–100)
LYMPHOCYTES # BLD AUTO: 2.78 X10(3) UL (ref 1–4)
LYMPHOCYTES NFR BLD AUTO: 26.9 %
MCH RBC QN AUTO: 27.3 PG (ref 26–34)
MCHC RBC AUTO-ENTMCNC: 32 G/DL (ref 31–37)
MCV RBC AUTO: 85.1 FL
MONOCYTES # BLD AUTO: 0.62 X10(3) UL (ref 0.1–1)
MONOCYTES NFR BLD AUTO: 6 %
NEUTROPHILS # BLD AUTO: 6.5 X10 (3) UL (ref 1.5–7.7)
NEUTROPHILS # BLD AUTO: 6.5 X10(3) UL (ref 1.5–7.7)
NEUTROPHILS NFR BLD AUTO: 62.9 %
NONHDLC SERPL-MCNC: 111 MG/DL (ref ?–130)
OSMOLALITY SERPL CALC.SUM OF ELEC: 290 MOSM/KG (ref 275–295)
PLATELET # BLD AUTO: 347 10(3)UL (ref 150–450)
POTASSIUM SERPL-SCNC: 4.1 MMOL/L (ref 3.5–5.1)
PROT SERPL-MCNC: 7.9 G/DL (ref 6.4–8.2)
RBC # BLD AUTO: 4.44 X10(6)UL
SODIUM SERPL-SCNC: 140 MMOL/L (ref 136–145)
T4 FREE SERPL-MCNC: 1 NG/DL (ref 0.8–1.7)
TRIGL SERPL-MCNC: 103 MG/DL (ref 30–149)
TSI SER-ACNC: 5.04 MIU/ML (ref 0.36–3.74)
VIT D+METAB SERPL-MCNC: 43.7 NG/ML (ref 30–100)
VLDLC SERPL CALC-MCNC: 17 MG/DL (ref 0–30)
WBC # BLD AUTO: 10.3 X10(3) UL (ref 4–11)

## 2023-05-01 PROCEDURE — 80061 LIPID PANEL: CPT | Performed by: FAMILY MEDICINE

## 2023-05-01 PROCEDURE — 83036 HEMOGLOBIN GLYCOSYLATED A1C: CPT | Performed by: FAMILY MEDICINE

## 2023-05-01 PROCEDURE — 84439 ASSAY OF FREE THYROXINE: CPT | Performed by: FAMILY MEDICINE

## 2023-05-01 PROCEDURE — 80050 GENERAL HEALTH PANEL: CPT | Performed by: FAMILY MEDICINE

## 2023-05-01 PROCEDURE — 82306 VITAMIN D 25 HYDROXY: CPT | Performed by: FAMILY MEDICINE

## 2023-05-02 ENCOUNTER — PATIENT MESSAGE (OUTPATIENT)
Dept: INTERNAL MEDICINE CLINIC | Facility: CLINIC | Age: 58
End: 2023-05-02

## 2023-05-02 ENCOUNTER — TELEPHONE (OUTPATIENT)
Dept: INTERNAL MEDICINE CLINIC | Facility: CLINIC | Age: 58
End: 2023-05-02

## 2023-05-02 DIAGNOSIS — R73.09 ELEVATED HEMOGLOBIN A1C: ICD-10-CM

## 2023-05-02 DIAGNOSIS — R74.01 ELEVATED AST (SGOT): Primary | ICD-10-CM

## 2023-05-02 DIAGNOSIS — R79.89 ELEVATED TSH: Primary | ICD-10-CM

## 2023-05-02 LAB
EST. AVERAGE GLUCOSE BLD GHB EST-MCNC: 146 MG/DL (ref 68–126)
HBA1C MFR BLD: 6.7 % (ref ?–5.7)

## 2023-05-02 PROCEDURE — 87624 HPV HI-RISK TYP POOLED RSLT: CPT | Performed by: OBSTETRICS & GYNECOLOGY

## 2023-05-02 PROCEDURE — 87086 URINE CULTURE/COLONY COUNT: CPT | Performed by: OBSTETRICS & GYNECOLOGY

## 2023-05-02 PROCEDURE — 88175 CYTOPATH C/V AUTO FLUID REDO: CPT | Performed by: OBSTETRICS & GYNECOLOGY

## 2023-05-06 ENCOUNTER — LAB ENCOUNTER (OUTPATIENT)
Dept: LAB | Age: 58
End: 2023-05-06
Attending: FAMILY MEDICINE
Payer: COMMERCIAL

## 2023-05-06 DIAGNOSIS — R79.89 ELEVATED TSH: ICD-10-CM

## 2023-05-06 LAB — TSI SER-ACNC: 2.34 MIU/ML (ref 0.36–3.74)

## 2023-05-06 PROCEDURE — 36415 COLL VENOUS BLD VENIPUNCTURE: CPT

## 2023-05-06 PROCEDURE — 84443 ASSAY THYROID STIM HORMONE: CPT

## 2023-06-22 DIAGNOSIS — E78.00 PURE HYPERCHOLESTEROLEMIA: ICD-10-CM

## 2023-06-22 DIAGNOSIS — I10 ESSENTIAL HYPERTENSION, BENIGN: ICD-10-CM

## 2023-06-22 RX ORDER — METOPROLOL TARTRATE 50 MG/1
TABLET, FILM COATED ORAL
Qty: 180 TABLET | Refills: 0 | Status: SHIPPED | OUTPATIENT
Start: 2023-06-22

## 2023-06-22 RX ORDER — ATORVASTATIN CALCIUM 20 MG/1
TABLET, FILM COATED ORAL
Qty: 90 TABLET | Refills: 2 | Status: SHIPPED | OUTPATIENT
Start: 2023-06-22

## 2023-06-22 NOTE — TELEPHONE ENCOUNTER
Atorvastatin 20 mg  Filled 3-25-23  Qty 90  0 refills  No upcoming appt. LOV 3-23-23 SM  Labs 5-1-23 Lipid    Metoprolol 50 mg  Filled 3-25-23  Qty 180  0 refills  No upcoming appt.   LOV 3-23-23 SM

## 2023-07-03 ENCOUNTER — LAB ENCOUNTER (OUTPATIENT)
Dept: LAB | Age: 58
End: 2023-07-03
Attending: FAMILY MEDICINE
Payer: COMMERCIAL

## 2023-07-03 DIAGNOSIS — R74.01 ELEVATED AST (SGOT): ICD-10-CM

## 2023-07-03 DIAGNOSIS — R73.09 ELEVATED HEMOGLOBIN A1C: ICD-10-CM

## 2023-07-03 LAB
AST SERPL-CCNC: 52 U/L (ref 15–37)
EST. AVERAGE GLUCOSE BLD GHB EST-MCNC: 143 MG/DL (ref 68–126)
HBA1C MFR BLD: 6.6 % (ref ?–5.7)

## 2023-07-03 PROCEDURE — 83036 HEMOGLOBIN GLYCOSYLATED A1C: CPT | Performed by: FAMILY MEDICINE

## 2023-07-03 PROCEDURE — 84450 TRANSFERASE (AST) (SGOT): CPT | Performed by: FAMILY MEDICINE

## 2023-07-05 ENCOUNTER — TELEPHONE (OUTPATIENT)
Dept: INTERNAL MEDICINE CLINIC | Facility: CLINIC | Age: 58
End: 2023-07-05

## 2023-07-05 DIAGNOSIS — R74.01 ELEVATED AST (SGOT): Primary | ICD-10-CM

## 2023-07-05 NOTE — TELEPHONE ENCOUNTER
Pt notified, doc under result note. Follow-up lab order for 3 months placed. Pepcid 20 mg 2x last dose @0800

## 2023-07-05 NOTE — TELEPHONE ENCOUNTER
----- Message from TEJ Garcia sent at 7/4/2023  9:58 AM CDT -----  Hgba1c down a little. Pt to continue to watch her diet and loose weight. AST up more. Pt to continue to watch the Tylenol,Motrin ,Norco use. Loosing weight and lowering cholesterol would also help. Recheck level again in 3 month.

## 2023-07-06 ENCOUNTER — PATIENT MESSAGE (OUTPATIENT)
Dept: INTERNAL MEDICINE CLINIC | Facility: CLINIC | Age: 58
End: 2023-07-06

## 2023-07-06 DIAGNOSIS — R74.01 ELEVATED AST (SGOT): Primary | ICD-10-CM

## 2023-07-29 DIAGNOSIS — F34.1 DYSTHYMIA: ICD-10-CM

## 2023-07-29 DIAGNOSIS — F41.9 ANXIETY: ICD-10-CM

## 2023-07-29 RX ORDER — ESCITALOPRAM OXALATE 10 MG/1
10 TABLET ORAL DAILY
Qty: 90 TABLET | Refills: 0 | Status: SHIPPED | OUTPATIENT
Start: 2023-07-29

## 2023-09-16 DIAGNOSIS — I10 ESSENTIAL HYPERTENSION, BENIGN: ICD-10-CM

## 2023-09-16 RX ORDER — METOPROLOL TARTRATE 50 MG/1
50 TABLET, FILM COATED ORAL 2 TIMES DAILY
Qty: 180 TABLET | Refills: 0 | OUTPATIENT
Start: 2023-09-16

## 2023-09-20 DIAGNOSIS — I10 ESSENTIAL HYPERTENSION, BENIGN: ICD-10-CM

## 2023-09-20 RX ORDER — METOPROLOL TARTRATE 50 MG/1
TABLET, FILM COATED ORAL
Qty: 180 TABLET | Refills: 0 | Status: SHIPPED | OUTPATIENT
Start: 2023-09-20

## 2023-09-20 NOTE — TELEPHONE ENCOUNTER
Requesting   Name from pharmacy: METOPROLOL TARTRATE 50 MG TAB          Will file in chart as: METOPROLOL TARTRATE 50 MG Oral Tab     Possible duplicate: Hover to review recent actions on this medication    Sig: TAKE 1 TABLET BY MOUTH TWICE A DAY    Disp: 180 tablet    Refills: 0 (Pharmacy requested: Not specified)    Start: 9/20/2023    Class: Normal    For: Essential hypertension, benign    Last ordered: 3 months ago (6/22/2023) by TEJ Alvarez    Last refill: 6/22/2023    Rx #: [id number]    Hypertension Medications Protocol [id number]/20/2023 02:55 PM   Protocol Details CMP or BMP in past 12 months    Last serum creatinine< 2.0    Appointment in past 6 or next 3 months        LOV: 3/23/2023  RTC: 6 months   Last Relevant Labs: 5/1/2023  Filled: 6/22/2023 #180 with 0 refills    Future Appointments   Date Time Provider Unruly Jean-Baptiste   9/22/2023  1:40 PM ZACHARY MCNAIR RM1 ZACHARY Bonilla   9/25/2023  1:30 PM TEJ Gutierrez EMG 8 EMG Maria Mbr

## 2023-09-22 ENCOUNTER — HOSPITAL ENCOUNTER (OUTPATIENT)
Dept: MAMMOGRAPHY | Age: 58
Discharge: HOME | End: 2023-09-22
Attending: OBSTETRICS & GYNECOLOGY
Payer: COMMERCIAL

## 2023-09-22 DIAGNOSIS — Z12.31 ENCOUNTER FOR SCREENING MAMMOGRAM FOR BREAST CANCER: ICD-10-CM

## 2023-09-22 PROCEDURE — 77067 SCR MAMMO BI INCL CAD: CPT | Performed by: OBSTETRICS & GYNECOLOGY

## 2023-09-22 PROCEDURE — 77063 BREAST TOMOSYNTHESIS BI: CPT | Performed by: OBSTETRICS & GYNECOLOGY

## 2023-10-10 ENCOUNTER — TELEPHONE (OUTPATIENT)
Dept: INTERNAL MEDICINE CLINIC | Facility: CLINIC | Age: 58
End: 2023-10-10

## 2023-10-22 DIAGNOSIS — F41.9 ANXIETY: ICD-10-CM

## 2023-10-22 DIAGNOSIS — F34.1 DYSTHYMIA: ICD-10-CM

## 2023-10-22 RX ORDER — ESCITALOPRAM OXALATE 10 MG/1
10 TABLET ORAL DAILY
Qty: 90 TABLET | Refills: 0 | OUTPATIENT
Start: 2023-10-22

## 2023-10-31 ENCOUNTER — PATIENT MESSAGE (OUTPATIENT)
Dept: INTERNAL MEDICINE CLINIC | Facility: CLINIC | Age: 58
End: 2023-10-31

## 2023-10-31 DIAGNOSIS — F41.9 ANXIETY: ICD-10-CM

## 2023-10-31 DIAGNOSIS — F34.1 DYSTHYMIA: ICD-10-CM

## 2023-11-01 LAB
ALBUMIN/GLOBULIN RATIO: 1.3
ALBUMIN: 4.1 G/DL
ALKALINE PHOSPHATASE: 98
ALT: 30
AST: 36
BILIRUBIN, DIRECT: 0.2 MG/DL
BILIRUBIN, TOTAL: 0.7 MG/DL
CHOL/HDL RATIO: 3.5
CHOLESTEROL, TOTAL: 198 MG/DL
CREATININE: 0.82 MG/DL
EGFR: 82
GGT: 66 IU/L
GLOBULIN: 3.2
GLUCOSE: 118
HDL CHOLESTEROL: 57 MG/DL
HEMOGLOBIN A1C: 6.3 % (ref 4.3–5.6)
LDL CHOLESTEROL: 123 MG/DL (ref ?–130)
NON HDL CHOL: 141
TOTAL PROTEIN: 7.3
TRIGLYCERIDES: 84 MG/DL

## 2023-11-01 RX ORDER — ESCITALOPRAM OXALATE 10 MG/1
10 TABLET ORAL DAILY
Qty: 90 TABLET | Refills: 0 | Status: SHIPPED | OUTPATIENT
Start: 2023-11-01

## 2023-11-01 NOTE — TELEPHONE ENCOUNTER
From: Abdias Godoy  To: Stevenson Amezquita  Sent: 10/31/2023 5:32 PM CDT  Subject: Med refill    Hi Pallavi Flower, my employer just sent me for a medical screening and I have attached the results of all the blood tests. Would I be able to get a refill on the Escitalopram with these results? It's for the [facility] on [address] in [place], and needs to be 90 days to keep the insurance happy. Thank you!

## 2023-12-13 DIAGNOSIS — I10 ESSENTIAL HYPERTENSION, BENIGN: ICD-10-CM

## 2023-12-13 NOTE — TELEPHONE ENCOUNTER
LM for pt to return call. Due for CPX+ BP f/u appt. Metoprolol 50 mg  Hypertension Medications Protocol [id number]/13/2023 08:31 AM   Protocol Details Appointment in past 6 or next 3 months   Filled 9-20-23  Qty 180  0 refill  No future appointments.   LOV 3-23-23

## 2023-12-21 ENCOUNTER — OFFICE VISIT (OUTPATIENT)
Dept: INTERNAL MEDICINE CLINIC | Facility: CLINIC | Age: 58
End: 2023-12-21
Payer: COMMERCIAL

## 2023-12-21 VITALS
DIASTOLIC BLOOD PRESSURE: 80 MMHG | HEIGHT: 65 IN | HEART RATE: 69 BPM | BODY MASS INDEX: 42.62 KG/M2 | WEIGHT: 255.81 LBS | SYSTOLIC BLOOD PRESSURE: 130 MMHG | TEMPERATURE: 98 F | OXYGEN SATURATION: 98 %

## 2023-12-21 DIAGNOSIS — F34.1 DYSTHYMIA: ICD-10-CM

## 2023-12-21 DIAGNOSIS — I10 ESSENTIAL HYPERTENSION, BENIGN: Primary | ICD-10-CM

## 2023-12-21 DIAGNOSIS — L30.9 ECZEMA, UNSPECIFIED TYPE: ICD-10-CM

## 2023-12-21 DIAGNOSIS — G89.29 CHRONIC PAIN OF RIGHT ANKLE: ICD-10-CM

## 2023-12-21 DIAGNOSIS — E78.00 PURE HYPERCHOLESTEROLEMIA: ICD-10-CM

## 2023-12-21 DIAGNOSIS — F41.9 ANXIETY: ICD-10-CM

## 2023-12-21 DIAGNOSIS — M25.571 CHRONIC PAIN OF RIGHT ANKLE: ICD-10-CM

## 2023-12-21 PROCEDURE — 3075F SYST BP GE 130 - 139MM HG: CPT | Performed by: FAMILY MEDICINE

## 2023-12-21 PROCEDURE — 99214 OFFICE O/P EST MOD 30 MIN: CPT | Performed by: FAMILY MEDICINE

## 2023-12-21 PROCEDURE — 3079F DIAST BP 80-89 MM HG: CPT | Performed by: FAMILY MEDICINE

## 2023-12-21 PROCEDURE — 3008F BODY MASS INDEX DOCD: CPT | Performed by: FAMILY MEDICINE

## 2023-12-21 RX ORDER — ESCITALOPRAM OXALATE 10 MG/1
10 TABLET ORAL DAILY
Qty: 90 TABLET | Refills: 0 | Status: SHIPPED | OUTPATIENT
Start: 2023-12-21

## 2023-12-21 RX ORDER — ACETAMINOPHEN AND CODEINE PHOSPHATE 300; 30 MG/1; MG/1
1 TABLET ORAL EVERY 6 HOURS PRN
Qty: 30 TABLET | Refills: 0 | Status: SHIPPED | OUTPATIENT
Start: 2023-12-21

## 2023-12-21 RX ORDER — ALPRAZOLAM 0.5 MG/1
TABLET ORAL
Qty: 30 TABLET | Refills: 0 | Status: SHIPPED | OUTPATIENT
Start: 2023-12-21

## 2023-12-21 RX ORDER — METOPROLOL TARTRATE 50 MG/1
TABLET, FILM COATED ORAL
Qty: 180 TABLET | Refills: 0 | OUTPATIENT
Start: 2023-12-21

## 2023-12-21 RX ORDER — ATORVASTATIN CALCIUM 20 MG/1
20 TABLET, FILM COATED ORAL NIGHTLY
Qty: 90 TABLET | Refills: 0 | Status: SHIPPED | OUTPATIENT
Start: 2023-12-21

## 2023-12-21 RX ORDER — METOPROLOL TARTRATE 50 MG/1
50 TABLET, FILM COATED ORAL 2 TIMES DAILY
Qty: 180 TABLET | Refills: 0 | Status: SHIPPED | OUTPATIENT
Start: 2023-12-21

## 2023-12-26 ENCOUNTER — TELEPHONE (OUTPATIENT)
Dept: INTERNAL MEDICINE CLINIC | Facility: CLINIC | Age: 58
End: 2023-12-26

## 2023-12-26 RX ORDER — HYDROCODONE BITARTRATE AND ACETAMINOPHEN 5; 325 MG/1; MG/1
1 TABLET ORAL EVERY 8 HOURS PRN
Qty: 30 TABLET | Refills: 0 | Status: SHIPPED | OUTPATIENT
Start: 2023-12-26

## 2024-03-20 DIAGNOSIS — I10 ESSENTIAL HYPERTENSION, BENIGN: ICD-10-CM

## 2024-03-20 RX ORDER — METOPROLOL TARTRATE 50 MG/1
50 TABLET, FILM COATED ORAL 2 TIMES DAILY
Qty: 180 TABLET | Refills: 0 | Status: SHIPPED | OUTPATIENT
Start: 2024-03-20

## 2024-04-23 DIAGNOSIS — F41.9 ANXIETY: ICD-10-CM

## 2024-04-23 DIAGNOSIS — F34.1 DYSTHYMIA: ICD-10-CM

## 2024-04-23 RX ORDER — ESCITALOPRAM OXALATE 10 MG/1
10 TABLET ORAL DAILY
Qty: 90 TABLET | Refills: 0 | Status: SHIPPED | OUTPATIENT
Start: 2024-04-23

## 2024-06-05 ENCOUNTER — OFFICE VISIT (OUTPATIENT)
Facility: CLINIC | Age: 59
End: 2024-06-05
Payer: COMMERCIAL

## 2024-06-05 ENCOUNTER — ULTRASOUND ENCOUNTER (OUTPATIENT)
Facility: CLINIC | Age: 59
End: 2024-06-05
Payer: COMMERCIAL

## 2024-06-05 VITALS
DIASTOLIC BLOOD PRESSURE: 76 MMHG | WEIGHT: 264 LBS | BODY MASS INDEX: 43.99 KG/M2 | SYSTOLIC BLOOD PRESSURE: 122 MMHG | HEIGHT: 65 IN

## 2024-06-05 DIAGNOSIS — Z01.419 ENCOUNTER FOR ANNUAL ROUTINE GYNECOLOGICAL EXAMINATION: ICD-10-CM

## 2024-06-05 DIAGNOSIS — N95.0 PMB (POSTMENOPAUSAL BLEEDING): Primary | ICD-10-CM

## 2024-06-05 DIAGNOSIS — R87.610 ATYPICAL SQUAMOUS CELLS OF UNDETERMINED SIGNIFICANCE (ASCUS) ON PAPANICOLAOU SMEAR OF CERVIX: Primary | ICD-10-CM

## 2024-06-05 DIAGNOSIS — R31.0 GROSS HEMATURIA: ICD-10-CM

## 2024-06-05 DIAGNOSIS — N95.0 POSTMENOPAUSAL BLEEDING: ICD-10-CM

## 2024-06-05 PROCEDURE — 3008F BODY MASS INDEX DOCD: CPT | Performed by: OBSTETRICS & GYNECOLOGY

## 2024-06-05 PROCEDURE — 99396 PREV VISIT EST AGE 40-64: CPT | Performed by: OBSTETRICS & GYNECOLOGY

## 2024-06-05 PROCEDURE — 3078F DIAST BP <80 MM HG: CPT | Performed by: OBSTETRICS & GYNECOLOGY

## 2024-06-05 PROCEDURE — 3074F SYST BP LT 130 MM HG: CPT | Performed by: OBSTETRICS & GYNECOLOGY

## 2024-06-05 PROCEDURE — 87624 HPV HI-RISK TYP POOLED RSLT: CPT | Performed by: OBSTETRICS & GYNECOLOGY

## 2024-06-05 NOTE — PROGRESS NOTES
GYN H&P     Genetic questionnaire reviewed with the patient and she will be referred for genetic counseling if the questionnaire had any positive results.    The [facility] intake form was also reviewed regarding contraception, menstrual periods, urinary health, and vaginal / sexual health    2024  4:51 PM    Chief Complaint   Patient presents with    Physical     CO postmenopausal bleeding.       HPI: Alia is a 58 year old  No LMP recorded. (Menstrual status: Menopause).   here for her annual gyn exam.     She has  complaint of blood in urine.   Menses are absent. Has had w/u with urology who has had multiple UA that show hematuria, urine cultures are negative. Also,  noted a kidney stone , but  does not think hematuria is coming from that. Has had EMbx done in --benign. Previous pelvic ultrasound has shown an endometrial stripe of 4mm.  Denies any  breast complaints.       Previous encounters and chart reviewed.     OB History    Para Term  AB Living   2 2 1 1   2   SAB IAB Ectopic Multiple Live Births           2      # Outcome Date GA Lbr Rudy/2nd Weight Sex Type Anes PTL Lv   2 Term 05 40w0d  6 lb 6 oz (2.892 kg) F NORMAL SPONT   SHEA   1  01 35w0d  5 lb 10 oz (2.551 kg) M NORMAL SPONT   SHEA       GYN hx:   Menarche: 11  Period Cycle (Days): n/a  Period Duration (Days): n/a  Period Flow: n/a  Use of Birth Control (if yes, specify type): Postmenopausal  Hx Prior Abnormal Pap: Yes  Pap Date: 23  Pap Result Notes: ascus/neg 10/6/2022 Lgsil/Neg; 2021 Ascus/Neg;  2021 Neg/Pos; 2020 ascus/pos;  \"normal\" per pt  Follow Up Recommendation:       Past Medical History:    Anxiety    Arthritis    Ankle injury    Atypical mole    On neckline    Closed fracture of unspecified part of tibia    left    Depression    Fatigue    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    Hemorrhoids    High cholesterol    Hereditary    History of depression     Post partum    Human papilloma virus infection    Lipid screening    KIM (obstructive sleep apnea)    AHI 14.3 O2 Lm 81% Supine AHI 16.6 Non-supine AHI 12.5     Other and unspecified hyperlipidemia    Pain in joints    Ankle injury    Stress    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    Unspecified essential hypertension    Wears glasses     Past Surgical History:   Procedure Laterality Date    Back surgery  11/2007    Colonoscopy  9/21/2009    repeat 5 yrs    Colonoscopy  2011    Colposcopy, cervix w/upper adjacent vagina; w/biopsy(s), cervix  07/14/2020    Foot/toes surgery proc unlisted      rt heel    Leg/ankle surgery proc unlisted  2006,2010    x2 rt ankle    Leg/ankle surgery proc unlisted  2002    surgical repair lt leg-has hardware d/t fx    [place] localization wire 1 site right (cpt=[id number])      Other surgical history  10/2007    spinal discectomy lumbar-L5 S1    Other surgical history      right lumpectomy    Other surgical history  11/2013    R foot sx/fusion at [facility]     Tonsillectomy  1970     Allergies   Allergen Reactions    Diovan [Valsartan]      TABS-difficulty swallowing    Zestril [Lisinopril] NAUSEA AND VOMITING     TABS     Current Outpatient Medications on File Prior to Visit   Medication Sig Dispense Refill    escitalopram 10 MG Oral Tab Take 1 tablet (10 mg total) by mouth daily. PHYSICAL DUE 90 tablet 0    METOPROLOL TARTRATE 50 MG Oral Tab TAKE 1 TABLET BY MOUTH TWICE A  tablet 0    HYDROcodone-acetaminophen (NORCO) 5-325 MG Oral Tab Take 1 tablet by mouth every 8 (eight) hours as needed for Pain. 30 tablet 0    ALPRAZolam 0.5 MG Oral Tab TAKE 1 TABLET BY MOUTH 2 TIMES DAILY AS NEEDED FOR AXIETY 30 tablet 0    atorvastatin 20 MG Oral Tab Take 1 tablet (20 mg total) by mouth nightly. 90 tablet 0    hydrocortisone 2.5 % External Cream APPLY TO AFFECTED AREA OF BREAST TWICE A DAY AS NEEDED 28.35 g 0    Acetaminophen-Codeine (TYLENOL WITH CODEINE #3) 300-30 MG Oral Tab Take 1 tablet by  mouth every 6 (six) hours as needed for Pain. 30 tablet 0    omega-3 fatty acids 1000 MG Oral Cap Fish Oil 1000 MG Oral Capsule QTY: 0 capsule Days: 0 Refills: 0  Written: 11/02/22 Patient Instructions:      Lutein 6 MG Oral Cap Take 6 mg by mouth daily.      HYDROcodone-acetaminophen (NORCO) 5-325 MG Oral Tab Take 1 tablet by mouth every 6 (six) hours as needed for Pain. 30 tablet 0    clotrimazole-betamethasone 1-0.05 % External Cream Apply 1 Application topically 2 (two) times daily as needed. 45 g 0    ferrous sulfate 325 (65 FE) MG Oral Tab EC Take 1 tablet (325 mg total) by mouth daily with breakfast. 90 tablet 0    Cholecalciferol (VITAMIN D) 1000 units Oral Tab Take 1 tablet by mouth daily.      diphenhydrAMINE 25 MG Oral Cap Take 1 capsule (25 mg total) by mouth every 6 (six) hours as needed for Sleep.      ondansetron (ZOFRAN) 4 mg tablet Take 1 tablet (4 mg total) by mouth every 8 (eight) hours as needed for Nausea. (Patient not taking: Reported on 6/5/2024) 20 tablet 0     No current facility-administered medications on file prior to visit.     Family History   Problem Relation Age of Onset    Heart Disease Father     Hypertension Father     Cancer Father     Other (Other) Father     Breast Cancer Mother 65    Cancer Mother     Hypertension Sister     Hypertension Brother     Diabetes Brother     Stroke Neg      Social History     Socioeconomic History    Marital status:      Spouse name: Not on file    Number of children: Not on file    Years of education: Not on file    Highest education level: Not on file   Occupational History    Not on file   Tobacco Use    Smoking status: Never    Smokeless tobacco: Never   Vaping Use    Vaping status: Never Used   Substance and Sexual Activity    Alcohol use: No    Drug use: No    Sexual activity: Not Currently   Other Topics Concern     Service Not Asked    Blood Transfusions Not Asked    Caffeine Concern No    Occupational Exposure Not Asked     Hobby Hazards Not Asked    Sleep Concern Not Asked    Stress Concern Not Asked    Weight Concern Not Asked    Special Diet Not Asked    Back Care Not Asked    Exercise No    Bike Helmet Not Asked    Seat Belt Not Asked    Self-Exams Not Asked   Social History Narrative    Not on file     Social Determinants of Health     Financial Resource Strain: Not on file   Food Insecurity: Not on file   Transportation Needs: Not on file   Physical Activity: Not on file   Stress: Not on file   Social Connections: Not on file   Housing Stability: Not on file       ROS:     Review of Systems:  General: denies fevers, chills, fatigue and malaise.   Eyes: no visual changes, denies headaches  ENT: no complaints, denies earaches, runny nose, epistaxis, throat pain or sore throat  Respiratory: denies SOB, dyspnea, cough or wheezing  Cardiovascular: denies chest pain, palpitations, exercise intolerance   GI: denies abdominal pain, diarrhea, constipation  : no complaints, denies dysuria, increased urinary frequency. Menses as above      Hematological/lymphatic: denies history of excessive bleeding or bruising, denies dizziness, lightheadedness.   Breast: denies rashes, skin changes, pain, lumps or discharge   Psychiatric: denies depression, changes in sleep patterns, anxiety  Endocrine: denies hot or cold intolerance, mood changes   Neurological: denies changes in sight, smell, hearing or taste. Denies seizures or tremors  Immunological: denies anaphylaxis, or swollen lymph nodes  Musculoskeletal: denies joint pain, morning stiffness, decreased range of motion         O /76   Ht 65\"   Wt 264 lb (119.7 kg)   BMI 43.93 kg/m²         Wt Readings from Last 6 Encounters:   06/05/24 264 lb (119.7 kg)   12/21/23 255 lb 12.8 oz (116 kg)   05/02/23 266 lb (120.7 kg)   03/23/23 262 lb 9.6 oz (119.1 kg)   12/18/22 262 lb (118.8 kg)   12/14/22 265 lb 3.2 oz (120.3 kg)     Exam:   GENERAL: well developed, well nourished, in no apparent  distress, oriented.  SKIN: no rashes, no suspicious lesions  HEENT: normal  NECK: supple; no thyromegaly, no adenopathy  LUNGS: clear to auscultation  CARDIOVASCULAR: normal S1, S2, RRR  BREASTS:  nontender, no palpable masses or nodes, no nipple discharge, no skin changes, no axillary adenopathy  ABDOMEN:  soft, non distended; non tender, no masses  PELVIC: External Genitalia: Normal appearing, no lesions.    Vagina: atrophic mucosa, no lesions, no blood present.    Bladder well supported.  No  anterior or posterior hernias    Cervix: no lesions , No CMT     Uterus: mobile, non tender, normal size    Adnexa: non tender, no masses    Rectal: deferred  EXTREMITIES:  non tender without edema        A/P: Patient is 58 year old female with complaint of hematuria. ? Of postmenopausal bleeding.  Here for well woman exam.            Patient counseled on:    Diet/exercise.      Self Breast Exams     Safe sex practices / and living environment     Vaccines:  Annual Flu, Tdap +/- Gardasil           Pap: LGSIL- Year:  2022 but HR HPV neg, ECC neg.; ASCUS in 2023  Mammogram:  2023   Colonoscopy :   2021  Lipid / Cholesterol:  2023         Meds This Visit:    Requested Prescriptions      No prescriptions requested or ordered in this encounter       1. Atypical squamous cells of undetermined significance (ASCUS) on Papanicolaou smear of cervix  - ThinPrep PAP Smear; Future  - Hpv High Risk , Thin Prep Collection; Future  - ThinPrep PAP Smear  - Hpv High Risk , Thin Prep Collection    2. Encounter for annual routine gynecological examination    3. Postmenopausal bleeding    4. Gross hematuria    Order pelvic ultrasound    No follow-ups on file.    Johan Locke MD   6/5/2024  4:51 PM       This note was created by Dragon voice recognition. Errors in content may be related to improper recognition by the system; efforts to review and correct have been done but errors may still exist. Please contact me with any questions.

## 2024-06-06 LAB — HPV I/H RISK 1 DNA SPEC QL NAA+PROBE: NEGATIVE

## 2024-06-08 DIAGNOSIS — E78.00 PURE HYPERCHOLESTEROLEMIA: ICD-10-CM

## 2024-06-08 RX ORDER — ATORVASTATIN CALCIUM 20 MG/1
20 TABLET, FILM COATED ORAL NIGHTLY
Qty: 90 TABLET | Refills: 0 | Status: SHIPPED | OUTPATIENT
Start: 2024-06-08

## 2024-06-11 LAB
.: NORMAL
.: NORMAL

## 2024-06-13 DIAGNOSIS — I10 ESSENTIAL HYPERTENSION, BENIGN: ICD-10-CM

## 2024-06-13 RX ORDER — METOPROLOL TARTRATE 50 MG/1
50 TABLET, FILM COATED ORAL 2 TIMES DAILY
Qty: 180 TABLET | Refills: 0 | Status: SHIPPED | OUTPATIENT
Start: 2024-06-13

## 2024-06-19 ENCOUNTER — OFFICE VISIT (OUTPATIENT)
Dept: INTERNAL MEDICINE CLINIC | Facility: CLINIC | Age: 59
End: 2024-06-19

## 2024-06-19 ENCOUNTER — LAB ENCOUNTER (OUTPATIENT)
Dept: LAB | Age: 59
End: 2024-06-19
Attending: FAMILY MEDICINE

## 2024-06-19 VITALS
SYSTOLIC BLOOD PRESSURE: 124 MMHG | OXYGEN SATURATION: 95 % | HEIGHT: 65 IN | RESPIRATION RATE: 18 BRPM | DIASTOLIC BLOOD PRESSURE: 76 MMHG | WEIGHT: 265.19 LBS | TEMPERATURE: 97 F | HEART RATE: 76 BPM | BODY MASS INDEX: 44.18 KG/M2

## 2024-06-19 DIAGNOSIS — L30.9 ECZEMA, UNSPECIFIED TYPE: ICD-10-CM

## 2024-06-19 DIAGNOSIS — R11.0 NAUSEA: ICD-10-CM

## 2024-06-19 DIAGNOSIS — E55.9 VITAMIN D DEFICIENCY: ICD-10-CM

## 2024-06-19 DIAGNOSIS — E78.00 PURE HYPERCHOLESTEROLEMIA: ICD-10-CM

## 2024-06-19 DIAGNOSIS — F41.9 ANXIETY: ICD-10-CM

## 2024-06-19 DIAGNOSIS — G89.29 CHRONIC PAIN OF RIGHT ANKLE: ICD-10-CM

## 2024-06-19 DIAGNOSIS — F34.1 DYSTHYMIA: ICD-10-CM

## 2024-06-19 DIAGNOSIS — I10 ESSENTIAL HYPERTENSION, BENIGN: ICD-10-CM

## 2024-06-19 DIAGNOSIS — M25.571 CHRONIC PAIN OF RIGHT ANKLE: ICD-10-CM

## 2024-06-19 DIAGNOSIS — Z00.00 LABORATORY EXAMINATION ORDERED AS PART OF A ROUTINE GENERAL MEDICAL EXAMINATION: Primary | ICD-10-CM

## 2024-06-19 DIAGNOSIS — Z00.00 LABORATORY EXAMINATION ORDERED AS PART OF A ROUTINE GENERAL MEDICAL EXAMINATION: ICD-10-CM

## 2024-06-19 LAB
ALBUMIN SERPL-MCNC: 4.4 G/DL (ref 3.2–4.8)
ALBUMIN/GLOB SERPL: 1.4 {RATIO} (ref 1–2)
ALP LIVER SERPL-CCNC: 104 U/L
ALT SERPL-CCNC: 29 U/L
ANION GAP SERPL CALC-SCNC: 8 MMOL/L (ref 0–18)
AST SERPL-CCNC: 38 U/L (ref ?–34)
BASOPHILS # BLD AUTO: 0.03 X10(3) UL (ref 0–0.2)
BASOPHILS NFR BLD AUTO: 0.4 %
BILIRUB SERPL-MCNC: 0.6 MG/DL (ref 0.3–1.2)
BUN BLD-MCNC: 13 MG/DL (ref 9–23)
BUN/CREAT SERPL: 15.9 (ref 10–20)
CALCIUM BLD-MCNC: 9.5 MG/DL (ref 8.7–10.4)
CHLORIDE SERPL-SCNC: 106 MMOL/L (ref 98–112)
CHOLEST SERPL-MCNC: 181 MG/DL (ref ?–200)
CO2 SERPL-SCNC: 26 MMOL/L (ref 21–32)
CREAT BLD-MCNC: 0.82 MG/DL
DEPRECATED RDW RBC AUTO: 45.7 FL (ref 35.1–46.3)
EGFRCR SERPLBLD CKD-EPI 2021: 82 ML/MIN/1.73M2 (ref 60–?)
EOSINOPHIL # BLD AUTO: 0.28 X10(3) UL (ref 0–0.7)
EOSINOPHIL NFR BLD AUTO: 3.7 %
ERYTHROCYTE [DISTWIDTH] IN BLOOD BY AUTOMATED COUNT: 14 % (ref 11–15)
EST. AVERAGE GLUCOSE BLD GHB EST-MCNC: 157 MG/DL (ref 68–126)
FASTING PATIENT LIPID ANSWER: YES
FASTING STATUS PATIENT QL REPORTED: YES
GLOBULIN PLAS-MCNC: 3.1 G/DL (ref 2–3.5)
GLUCOSE BLD-MCNC: 132 MG/DL (ref 70–99)
HBA1C MFR BLD: 7.1 % (ref ?–5.7)
HCT VFR BLD AUTO: 38.9 %
HDLC SERPL-MCNC: 52 MG/DL (ref 40–59)
HGB BLD-MCNC: 12.5 G/DL
IMM GRANULOCYTES # BLD AUTO: 0.04 X10(3) UL (ref 0–1)
IMM GRANULOCYTES NFR BLD: 0.5 %
LDLC SERPL CALC-MCNC: 116 MG/DL (ref ?–100)
LYMPHOCYTES # BLD AUTO: 1.88 X10(3) UL (ref 1–4)
LYMPHOCYTES NFR BLD AUTO: 24.6 %
MCH RBC QN AUTO: 28.6 PG (ref 26–34)
MCHC RBC AUTO-ENTMCNC: 32.1 G/DL (ref 31–37)
MCV RBC AUTO: 89 FL
MONOCYTES # BLD AUTO: 0.57 X10(3) UL (ref 0.1–1)
MONOCYTES NFR BLD AUTO: 7.5 %
NEUTROPHILS # BLD AUTO: 4.84 X10 (3) UL (ref 1.5–7.7)
NEUTROPHILS # BLD AUTO: 4.84 X10(3) UL (ref 1.5–7.7)
NEUTROPHILS NFR BLD AUTO: 63.3 %
NONHDLC SERPL-MCNC: 129 MG/DL (ref ?–130)
OSMOLALITY SERPL CALC.SUM OF ELEC: 292 MOSM/KG (ref 275–295)
PLATELET # BLD AUTO: 327 10(3)UL (ref 150–450)
POTASSIUM SERPL-SCNC: 4.4 MMOL/L (ref 3.5–5.1)
PROT SERPL-MCNC: 7.5 G/DL (ref 5.7–8.2)
RBC # BLD AUTO: 4.37 X10(6)UL
SODIUM SERPL-SCNC: 140 MMOL/L (ref 136–145)
TRIGL SERPL-MCNC: 70 MG/DL (ref 30–149)
TSI SER-ACNC: 3.62 MIU/ML (ref 0.55–4.78)
VIT D+METAB SERPL-MCNC: 31.6 NG/ML (ref 30–100)
VLDLC SERPL CALC-MCNC: 12 MG/DL (ref 0–30)
WBC # BLD AUTO: 7.6 X10(3) UL (ref 4–11)

## 2024-06-19 PROCEDURE — 3074F SYST BP LT 130 MM HG: CPT | Performed by: FAMILY MEDICINE

## 2024-06-19 PROCEDURE — 82306 VITAMIN D 25 HYDROXY: CPT | Performed by: FAMILY MEDICINE

## 2024-06-19 PROCEDURE — 99214 OFFICE O/P EST MOD 30 MIN: CPT | Performed by: FAMILY MEDICINE

## 2024-06-19 PROCEDURE — 3008F BODY MASS INDEX DOCD: CPT | Performed by: FAMILY MEDICINE

## 2024-06-19 PROCEDURE — 80050 GENERAL HEALTH PANEL: CPT | Performed by: FAMILY MEDICINE

## 2024-06-19 PROCEDURE — 3078F DIAST BP <80 MM HG: CPT | Performed by: FAMILY MEDICINE

## 2024-06-19 PROCEDURE — 80061 LIPID PANEL: CPT | Performed by: FAMILY MEDICINE

## 2024-06-19 PROCEDURE — 83036 HEMOGLOBIN GLYCOSYLATED A1C: CPT | Performed by: FAMILY MEDICINE

## 2024-06-19 RX ORDER — ESCITALOPRAM OXALATE 10 MG/1
10 TABLET ORAL DAILY
Qty: 90 TABLET | Refills: 0 | Status: SHIPPED | OUTPATIENT
Start: 2024-06-19

## 2024-06-19 RX ORDER — ONDANSETRON 4 MG/1
4 TABLET, FILM COATED ORAL EVERY 8 HOURS PRN
Qty: 20 TABLET | Refills: 0 | Status: SHIPPED | OUTPATIENT
Start: 2024-06-19

## 2024-06-19 RX ORDER — HYDROCODONE BITARTRATE AND ACETAMINOPHEN 5; 325 MG/1; MG/1
1 TABLET ORAL EVERY 8 HOURS PRN
Qty: 30 TABLET | Refills: 0 | Status: SHIPPED | OUTPATIENT
Start: 2024-06-19

## 2024-06-19 RX ORDER — ALPRAZOLAM 0.5 MG/1
TABLET ORAL
Qty: 30 TABLET | Refills: 0 | Status: SHIPPED | OUTPATIENT
Start: 2024-06-19

## 2024-06-19 NOTE — PROGRESS NOTES
CHIEF COMPLAINT:     Chief Complaint   Patient presents with    Medication Follow-Up       HPI:   Alia Lanza is a 58 year old female   Patient presents for recheck of  Hypertension and hyperlipdemia. Pt has been taking medications as instructed, no medication side effects, home BP occ done.LABS due.  Currently asymptomatic, no chest pains, jaw pains, arm pains. No headaches, dizziness or edema.  No SOB on exertion or rest.  Patient denies any myalgias or arthralgias on the cholesterol  medication. Pt has been following a low fat diet and has not been exercising.  Current lipid treatment: Lipitor    Pt is here for a recheck of anxiety/depression. Has been tolerating the meds well. Denies any side effects from the meds. Mood has been good. Patient's appetite is good.Pt denies headaches,tics,or insomnia.No depressive symptoms or suicidal ideations. Would like to continue the same medication.     Pt also needs a refill on her [place] for her chronic ankle pain that acts up when she walks a lot. Pt has had the same prescription of Norco since 12/26/23.  Pt usually only takes a half of pill when she takes it.      Pt still gets a rash on her breast  area on and off  and her eczema acts up also. The HC 2.5% cream works for all that and needs a refill.    Pt also needs a refill on her zofran. Pt will wake up nauseated some mornings. Pt feels it is probably related to her nerves/anxiety. Pt states it does help take the nausea away. Pt uses it only occasionally.    Current Outpatient Medications   Medication Sig Dispense Refill    METOPROLOL TARTRATE 50 MG Oral Tab TAKE 1 TABLET BY MOUTH TWICE A  tablet 0    ATORVASTATIN 20 MG Oral Tab TAKE 1 TABLET BY MOUTH EVERY DAY AT NIGHT 90 tablet 0    escitalopram 10 MG Oral Tab Take 1 tablet (10 mg total) by mouth daily. PHYSICAL DUE 90 tablet 0    HYDROcodone-acetaminophen (NORCO) 5-325 MG Oral Tab Take 1 tablet by mouth every 8 (eight) hours as needed for Pain. 30  tablet 0    ALPRAZolam 0.5 MG Oral Tab TAKE 1 TABLET BY MOUTH 2 TIMES DAILY AS NEEDED FOR AXIETY 30 tablet 0    hydrocortisone 2.5 % External Cream APPLY TO AFFECTED AREA OF BREAST TWICE A DAY AS NEEDED 28.35 g 0    Acetaminophen-Codeine (TYLENOL WITH CODEINE #3) 300-30 MG Oral Tab Take 1 tablet by mouth every 6 (six) hours as needed for Pain. 30 tablet 0    ondansetron (ZOFRAN) 4 mg tablet Take 1 tablet (4 mg total) by mouth every 8 (eight) hours as needed for Nausea. 20 tablet 0    omega-3 fatty acids 1000 MG Oral Cap Fish Oil 1000 MG Oral Capsule QTY: 0 capsule Days: 0 Refills: 0  Written: 11/02/22 Patient Instructions:      Lutein 6 MG Oral Cap Take 6 mg by mouth daily.      clotrimazole-betamethasone 1-0.05 % External Cream Apply 1 Application topically 2 (two) times daily as needed. 45 g 0    ferrous sulfate 325 (65 FE) MG Oral Tab EC Take 1 tablet (325 mg total) by mouth daily with breakfast. 90 tablet 0    Cholecalciferol (VITAMIN D) 1000 units Oral Tab Take 1 tablet by mouth daily.      diphenhydrAMINE 25 MG Oral Cap Take 1 capsule (25 mg total) by mouth every 6 (six) hours as needed for Sleep.        Past Medical History:    Anxiety    Arthritis    Ankle injury    Atypical mole    On neckline    Closed fracture of unspecified part of tibia    left    Depression    Fatigue    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    Hemorrhoids    High cholesterol    Hereditary    History of depression    Post partum    Human papilloma virus infection    Lipid screening    KIM (obstructive sleep apnea)    AHI 14.3 O2 Lm 81% Supine AHI 16.6 Non-supine AHI 12.5     Other and unspecified hyperlipidemia    Pain in joints    Ankle injury    Stress    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    Unspecified essential hypertension    Wears glasses      Social History:  Social History     Socioeconomic History    Marital status:    Tobacco Use    Smoking status: Never    Smokeless tobacco: Never   Vaping Use    Vaping status: Never Used    Substance and Sexual Activity    Alcohol use: No    Drug use: No    Sexual activity: Not Currently   Other Topics Concern    Caffeine Concern No    Exercise No        REVIEW OF SYSTEMS:   GENERAL:  Denies fever,weight change, decreased appetite and weakness.  SKIN: Denies rashes, skin wounds or ulcers.see HPI  EYES: Denies blurred vision or double vision  HENT: Denies congestion, sore throat or ear pain.    CHEST: Denies chest pain, or palpitations  LUNGS: Denies shortness of breath,wheezing or cough  GI: Denies abdominal pain, see HPI  MUSCULOSKELETAL: see HPI  LYMPH:  Denies lymphadenopathy  NEURO:  Denies headaches and lightheadedness.  No seizures, no confusion, no weakness, no abnormal sensation, no vertigo.  PSYCH: see HPI      EXAM:   /76 (BP Location: Left arm, Patient Position: Sitting, Cuff Size: adult)   Pulse 76   Temp 97.4 °F (36.3 °C) (Temporal)   Resp 18   Ht 5' 5\" (1.651 m)   Wt 265 lb 3.2 oz (120.3 kg)   SpO2 95%   BMI 44.13 kg/m²   GENERAL: well developed, well nourished,in no apparent distress  SKIN: no rashes currently  HEAD: atraumatic, normocephalic  EYES: conjunctiva clear, EOM intact, PERRLA  HEENT: ears,nose and throat clear  NECK: supple, non-tender  LUNGS: clear to auscultation bilaterally, no wheezes or rhonchi. Breathing is non labored.  CARDIO: RRR without murmur  GI: Soft. Non tender, no masses, no organomegaly  EXTREMITIES: no cyanosis, clubbing or edema   MUSC: right ankle minimal pain currently    ASSESSMENT AND PLAN:     Encounter Diagnoses   Name Primary?    Anxiety     Dysthymia     Eczema, unspecified type     Laboratory examination ordered as part of a routine general medical examination Yes    Vitamin D deficiency     Chronic pain of right ankle     Nausea     Pure hypercholesterolemia     Essential hypertension, benign        Orders Placed This Encounter   Procedures    CBC With Differential With Platelet    Comp Metabolic Panel (14)    Lipid Panel     Hemoglobin A1C    TSH W Reflex To Free T4    Vitamin D       Meds & Refills for this Visit:  Requested Prescriptions     Signed Prescriptions Disp Refills    ALPRAZolam 0.5 MG Oral Tab 30 tablet 0     Sig: TAKE 1 TABLET BY MOUTH 2 TIMES DAILY AS NEEDED FOR AXIETY    escitalopram 10 MG Oral Tab 90 tablet 0     Sig: Take 1 tablet (10 mg total) by mouth daily. PHYSICAL DUE    HYDROcodone-acetaminophen (NORCO) 5-325 MG Oral Tab 30 tablet 0     Sig: Take 1 tablet by mouth every 8 (eight) hours as needed for Pain.    hydrocortisone 2.5 % External Cream 28.35 g 0     Sig: APPLY TO AFFECTED AREA OF BREAST TWICE A DAY AS NEEDED    ondansetron (ZOFRAN) 4 mg tablet 20 tablet 0     Sig: Take 1 tablet (4 mg total) by mouth every 8 (eight) hours as needed for Nausea.       Imaging & Consults:  None    1. Anxiety  - stable continue medication  - ALPRAZolam 0.5 MG Oral Tab; TAKE 1 TABLET BY MOUTH 2 TIMES DAILY AS NEEDED FOR AXIETY  Dispense: 30 tablet; Refill: 0  - escitalopram 10 MG Oral Tab; Take 1 tablet (10 mg total) by mouth daily. PHYSICAL DUE  Dispense: 90 tablet; Refill: 0    2. Dysthymia  - stable, continue medication  - escitalopram 10 MG Oral Tab; Take 1 tablet (10 mg total) by mouth daily. PHYSICAL DUE  Dispense: 90 tablet; Refill: 0    3. Eczema, unspecified type  Stable, use as needed  - hydrocortisone 2.5 % External Cream; APPLY TO AFFECTED AREA OF BREAST TWICE A DAY AS NEEDED  Dispense: 28.35 g; Refill: 0    4. Laboratory examination ordered as part of a routine general medical examination  Schedule Px appt  - CBC With Differential With Platelet; Future  - Comp Metabolic Panel (14); Future  - Lipid Panel; Future  - Hemoglobin A1C; Future  - TSH W Reflex To Free T4; Future  - Vitamin D; Future    5. Vitamin D deficiency  Check level  - Hemoglobin A1C; Future    6. Chronic pain of right ankle  Stable, uses the medication only if needed  - HYDROcodone-acetaminophen (NORCO) 5-325 MG Oral Tab; Take 1 tablet by mouth  every 8 (eight) hours as needed for Pain.  Dispense: 30 tablet; Refill: 0    7. Nausea  Stable, uses the medication only if needed  - ondansetron (ZOFRAN) 4 mg tablet; Take 1 tablet (4 mg total) by mouth every 8 (eight) hours as needed for Nausea.  Dispense: 20 tablet; Refill: 0    8. Pure hypercholesterolemia  Pt to continue their medication, increase exercise,  choose better fats,  increase fiber and avoid processed foods.      9. Essential hypertension, benign  Conservative measures dicussed. Continue medication.  Diet and exercise explained and encouraged.  Fasting labs due.  Home blood pressure monitoring. Pt should measure BP’s two to three times per week. Goal blood pressure at home - < 135/85.     .   The patient indicates understanding of these issues and agrees to the plan.  The patient is asked to return in 1-3 months for her PX..

## 2024-06-20 ENCOUNTER — OFFICE VISIT (OUTPATIENT)
Facility: CLINIC | Age: 59
End: 2024-06-20

## 2024-06-20 VITALS
HEIGHT: 65 IN | BODY MASS INDEX: 43.99 KG/M2 | SYSTOLIC BLOOD PRESSURE: 110 MMHG | WEIGHT: 264 LBS | DIASTOLIC BLOOD PRESSURE: 70 MMHG

## 2024-06-20 DIAGNOSIS — N95.0 POSTMENOPAUSAL BLEEDING: Primary | ICD-10-CM

## 2024-06-20 PROCEDURE — 3078F DIAST BP <80 MM HG: CPT | Performed by: OBSTETRICS & GYNECOLOGY

## 2024-06-20 PROCEDURE — 58100 BIOPSY OF UTERUS LINING: CPT | Performed by: OBSTETRICS & GYNECOLOGY

## 2024-06-20 PROCEDURE — 3074F SYST BP LT 130 MM HG: CPT | Performed by: OBSTETRICS & GYNECOLOGY

## 2024-06-20 PROCEDURE — 88305 TISSUE EXAM BY PATHOLOGIST: CPT | Performed by: OBSTETRICS & GYNECOLOGY

## 2024-06-20 PROCEDURE — 3008F BODY MASS INDEX DOCD: CPT | Performed by: OBSTETRICS & GYNECOLOGY

## 2024-06-20 NOTE — PROGRESS NOTES
Here for endometrial biopsy for post menopausal bleeding.  Only notes blood in toilet on occasion. Is noted to have renal stone.  Explained to pt what procedure would entail. All questions answered. EMBx done after sterile prep. Uterus sounded to 6cm, minimal tissue obtained.  Pt tolerated well. Await results.

## 2024-06-21 ENCOUNTER — TELEPHONE (OUTPATIENT)
Dept: INTERNAL MEDICINE CLINIC | Facility: CLINIC | Age: 59
End: 2024-06-21

## 2024-06-21 DIAGNOSIS — R74.8 ELEVATED LIVER ENZYMES: Primary | ICD-10-CM

## 2024-06-21 NOTE — TELEPHONE ENCOUNTER
----- Message from Irena Hays sent at 6/20/2024  8:30 AM CDT -----  Hgba1c elevated 7.1 and average glucose 157 this is into the diabetic range. As discussed at her visit since she is in the DM range could try to get Ozempic approved to help her DM and her weight. Would she like to proceed with that now that her numbers are worse?  AST up slightly.  Pt to watch the Tylenol,Motrin ,narcotic and alcohol use. Loosing weight and lowering cholesterol would also help.  Pt to avoid processed foods and eat more organic foods like fruits and vegetables. A diet rich in omega fatty acids would also help. Recheck level again in 1 month.  LDL up slightly, continue to watch the fats in the diet and take her cholesterol medication.  Rest of labs look good.

## 2024-06-21 NOTE — TELEPHONE ENCOUNTER
S/w patient, verified name/. Reviewed results/recommendations below from provider. Patient voiced understanding.     Patient wants to proceed with Ozempic. Pended rx

## 2024-06-22 RX ORDER — SEMAGLUTIDE 0.68 MG/ML
0.25 INJECTION, SOLUTION SUBCUTANEOUS WEEKLY
Qty: 3 ML | Refills: 0 | Status: SHIPPED | OUTPATIENT
Start: 2024-06-22

## 2024-06-24 ENCOUNTER — TELEPHONE (OUTPATIENT)
Dept: INTERNAL MEDICINE CLINIC | Facility: CLINIC | Age: 59
End: 2024-06-24

## 2024-07-02 ENCOUNTER — HOSPITAL ENCOUNTER (EMERGENCY)
Facility: HOSPITAL | Age: 59
Discharge: HOME | End: 2024-07-03
Attending: EMERGENCY MEDICINE
Payer: COMMERCIAL

## 2024-07-02 VITALS
TEMPERATURE: 98 F | OXYGEN SATURATION: 95 % | SYSTOLIC BLOOD PRESSURE: 135 MMHG | WEIGHT: 260 LBS | BODY MASS INDEX: 43 KG/M2 | HEART RATE: 83 BPM | RESPIRATION RATE: 18 BRPM | DIASTOLIC BLOOD PRESSURE: 78 MMHG

## 2024-07-02 DIAGNOSIS — S61.411A LACERATION OF RIGHT HAND WITHOUT FOREIGN BODY, INITIAL ENCOUNTER: Primary | ICD-10-CM

## 2024-07-02 PROCEDURE — 99283 EMERGENCY DEPT VISIT LOW MDM: CPT

## 2024-07-02 PROCEDURE — 12001 RPR S/N/AX/GEN/TRNK 2.5CM/<: CPT

## 2024-07-03 NOTE — ED PROVIDER NOTES
Patient Seen in: [facility] Emergency Department      History     Chief Complaint   Patient presents with    Laceration/Abrasion     Stated Complaint: Left hand/finger web laceration at 1500 today    Subjective:   HPI    Cut left hand gardening-it has continued to bleed despite washing it, applying pressure, it is between her second and third fingers and the web space.  It is a very small laceration but bleeding remains uncontrolled.  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    Unspecified essential hypertension    Wears glasses              Past Surgical History:   Procedure Laterality Date    Back surgery  11/2007    Colonoscopy  9/21/2009    repeat 5 yrs    Colonoscopy  2011    Colposcopy, cervix w/upper adjacent vagina; w/biopsy(s), cervix  07/14/2020    Foot/toes surgery proc unlisted      rt heel    Leg/ankle surgery proc unlisted  2006,2010    x2 rt ankle    Leg/ankle surgery proc unlisted  2002    surgical repair lt leg-has hardware d/t fx    Lamar localization wire 1 site right (cpt=19281)      Other surgical history  10/2007    spinal discectomy lumbar-L5 S1    Other surgical history      right lumpectomy    Other surgical history  11/2013    R foot sx/fusion at [facility]     Tonsillectomy  1970                Social History     Socioeconomic History    Marital status:     Tobacco Use    Smoking status: Never    Smokeless tobacco: Never   Vaping Use    Vaping status: Never Used   Substance and Sexual Activity    Alcohol use: No    Drug use: No    Sexual activity: Yes     Partners: Male   Other Topics Concern    Caffeine Concern No    Exercise No              Review of Systems    Positive for stated Chief Complaint: Laceration/Abrasion    Other systems are as noted in HPI.  Constitutional and vital signs reviewed.      All other systems reviewed and negative except as noted above.    Physical Exam     ED Triage Vitals   BP 07/02/24 2311 135/78   Pulse 07/02/24 2311 83   Resp 07/02/24 2311 18   Temp 07/02/24 2311 98.4 °F (36.9 °C)   Temp src --    SpO2 07/02/24 2311 95 %   O2 Device 07/03/24 0050 None (Room air)       Current Vitals:   Vital Signs  MAP (mmHg): 90    Oxygen Therapy  O2 Device: None (Room air)            Physical Exam    Pleasant well-developed well-nourished patient sitting on emergency department bed she is in no acute distress.  Focused physical exam centers on a very small laceration between second and third finger in the webspace very small laceration noted however, bleeding is not controlled.  Patient continues to ooze from the that is approximately 3 mm.  ED Course   Labs Reviewed - No data to display          Bupivacaine 0.5% and lidocaine 1% applied into the base of the laceration after copious irrigation then, 2 interrupted 5-0 Ethilon was placed to control bleeding.  It was controlled following.  Gelfoam and dressing placed patient stable for discharge home         MDM      58-year-old presents to the emergency department with a small laceration between her feet the laceration has continued to bleed despite applying pressure so she comes to the emergency department for treatment and evaluation.  Sutures were applied.  Bleeding controlled.  Stable for discharge home no signs of acute tendon or ligamentous laceration discussed with the patient that we cannot  rule out a cutaneous nerve root injury but fingers are functioning appropriately.                                   Medical Decision Making      Disposition and Plan     Clinical Impression:  1. Laceration of right hand without foreign body, initial encounter         Disposition:  Discharge  7/3/2024 12:46 am    Follow-up:  [facility] Emergency Department  [address] [place] [id number]  [phone]  Follow up in 10 day(s)  For suture removal    2 interrupted ethilon sutures          Medications Prescribed:  Discharge Medication List as of 7/3/2024 12:50 AM

## 2024-07-03 NOTE — ED INITIAL ASSESSMENT (HPI)
Patient endorses laceration left hand between middle finger and ring finger in the fold of skin. Patient stated she cut her hand around 1500 today picking up metal in her garden. Bleeding has not stopped since then denies any blood clot or thinner use. Radial pulse intact. Last tetanus vaccine was 2 years ago

## 2024-07-15 ENCOUNTER — MED REC SCAN ONLY (OUTPATIENT)
Facility: CLINIC | Age: 59
End: 2024-07-15

## 2024-07-24 RX ORDER — SEMAGLUTIDE 0.68 MG/ML
0.5 INJECTION, SOLUTION SUBCUTANEOUS WEEKLY
Qty: 3 ML | Refills: 0 | Status: SHIPPED | OUTPATIENT
Start: 2024-07-24

## 2024-08-19 RX ORDER — SEMAGLUTIDE 0.68 MG/ML
0.5 INJECTION, SOLUTION SUBCUTANEOUS WEEKLY
Refills: 0 | OUTPATIENT
Start: 2024-08-19

## 2024-08-19 NOTE — TELEPHONE ENCOUNTER
Ozempic 2 mg    Diabetes Medication Protocol [id number]/18/2024 03:27 PM   Protocol Details Microalbumin procedure in past 12 months or taking ACE/ARB      Filled 7-24-24  Qty 3 mL  0 refills  No future appointments.  LOV 6-19-24 SM  Labs 6-19-24 A1c

## 2024-09-01 NOTE — PROGRESS NOTES
HPI:   Alia Lanza is a 59 year old female who presents for a complete physical exam. Symptoms: denies discharge, itching, burning or dysuria, is menopausal. .   Regular SBE- yes,Sexually active- yes,  Contraception- menopausal. STD history- none    Patient presents for recheck of  Hypertension and hyperlipdemia. Pt has been taking medications as instructed, no medication side effects, home BP occ done.LABS due.  Currently asymptomatic, no chest pains, jaw pains, arm pains. No headaches, dizziness or edema.  No SOB on exertion or rest.  Patient denies any myalgias or arthralgias on the cholesterol  medication. Pt has been following a low fat diet and has not been exercising.  Current lipid treatment: Lipitor     Pt is here for a recheck of anxiety/depression. Has been tolerating the meds well. Denies any side effects from the meds. Mood has been good. Patient's appetite is good.Pt denies headaches,tics,or insomnia.No depressive symptoms or suicidal ideations. Would like to continue the same medication.      Pt also needs a refill on her Gunlock for her chronic ankle pain that acts up when she walks a lot. Pt has had the same prescription of Norco since 12/26/23.  Pt usually only takes a half of pill when she takes it.    Pt also here  for recheck of her diabetes.   Patient’s FBS have not been done.  Last Hgba1c: 7.1 on 6/19/24  Urine Microalbumin: 4/2/24 wnl  Last visit with ophthalmologist was -   Pt has been checking her feet on a regular basis. Pt denies any tingling of the feet.       Wt Readings from Last 6 Encounters:   09/03/24 257 lb 9.6 oz (116.8 kg)   07/02/24 260 lb (117.9 kg)   06/20/24 264 lb (119.7 kg)   06/19/24 265 lb 3.2 oz (120.3 kg)   06/05/24 264 lb (119.7 kg)   12/21/23 255 lb 12.8 oz (116 kg)     Body mass index is 42.87 kg/m².     HEMOGLOBIN A1C (%)   Date Value   10/27/2023 6.3 (A)     HEMOGLOBIN A1c (% of total Hgb)   Date Value   01/03/2017 5.9 (H)     HgbA1C (%)   Date Value    06/19/2024 7.1 (H)   07/03/2023 6.6 (H)   05/01/2023 6.7 (H)     Cholesterol, Total (mg/dL)   Date Value   06/19/2024 181   10/27/2023 198   05/01/2023 180   12/17/2021 200 (H)   08/03/2020 179     CHOLESTEROL, TOTAL (mg/dL)   Date Value   01/03/2017 199   09/14/2011 189     CHOLESTEROL (mg/dL)   Date Value   07/02/2014 213 (H)   07/17/2013 187   07/06/2012 198     HDL Cholesterol (mg/dL)   Date Value   06/19/2024 52   10/27/2023 57   05/01/2023 69 (H)   12/17/2021 56   08/03/2020 56     HDL CHOLESTEROL (mg/dL)   Date Value   01/03/2017 59   09/14/2011 78     HDL CHOL (mg/dL)   Date Value   07/02/2014 62   07/17/2013 65   07/06/2012 80     LDL Cholesterol (mg/dL)   Date Value   06/19/2024 116 (H)   10/27/2023 123   05/01/2023 93   12/17/2021 126   08/03/2020 104 (H)     LDL-CHOLESTEROL (mg/dL (calc))   Date Value   01/03/2017 115   09/14/2011 95     LDL CHOLESTROL (mg/dL)   Date Value   07/02/2014 132 (H)   07/17/2013 105   07/06/2012 101 (H)     Triglycerides (mg/dL)   Date Value   10/27/2023 84     AST   Date Value   06/19/2024 38 U/L (H)   10/27/2023 36   07/03/2023 52 U/L (H)   05/01/2023 39 U/L (H)   01/03/2017 24 U/L   07/02/2014 25 U/L   07/17/2013 19 U/L   07/06/2012 12 U/L (L)   09/14/2011 14 U/L     ALT   Date Value   06/19/2024 29 U/L   10/27/2023 30   05/01/2023 42 U/L   03/23/2022 41 U/L   01/03/2017 31 U/L (H)   07/02/2014 31 U/L   07/17/2013 29 U/L   07/06/2012 14 U/L   09/14/2011 8 U/L      No results found for: \"MICROALBCREA\"    Screening:  Immunization History   Administered Date(s) Administered    Covid-19 Vaccine Pfizer 30 mcg/0.3 ml 04/01/2021, 04/22/2021    Covid-19 Vaccine Pfizer Nicolás-Sucrose 30 mcg/0.3 ml 01/12/2022    FLUZONE 6 months and older PFS 0.5 ml (46987) 11/12/2020    TD 09/04/2002    TDAP 01/01/2007, 04/11/2019, 12/18/2022    Zoster Vaccine Live (Zostavax) 10/06/2015    Zoster Vaccine Recombinant Adjuvanted (Shingrix) 12/19/2020, 03/23/2023   Pended Date(s) Pended    FLULAVAL 6  months & older 0.5 ml Prefilled syringe (82132) 12/14/2022      Menarche- 12 yr  PAP- 6/5/24  Any abnormal pap smears- yes LGSIL  Pregnancies- 2, Live births- 2  Mammogram-  9/22/23   Any breast cancer- mother, or any gynecological cancer- none, any cancers dad lung cancer/smoker  Labs- 6/19/24  DEXA over 65yr- n/a  Flu shot-  none  Colon- 2011? Pt can not have  Glasses/contacts- yes, last exam- 2023  Dental visits- 6/2024    Immunization History   Administered Date(s) Administered    Covid-19 Vaccine Pfizer 30 mcg/0.3 ml 04/01/2021, 04/22/2021    Covid-19 Vaccine Pfizer Nicolás-Sucrose 30 mcg/0.3 ml 01/12/2022    FLUZONE 6 months and older PFS 0.5 ml ([id number]) 11/12/2020    TD 09/04/2002    TDAP 01/01/2007, 04/11/2019, 12/18/2022    Zoster Vaccine Live (Zostavax) 10/06/2015    Zoster Vaccine Recombinant Adjuvanted (Shingrix) 12/19/2020, 03/23/2023   Pended Date(s) Pended    FLULAVAL 6 months & older 0.5 ml Prefilled syringe (78076) 12/14/2022     Wt Readings from Last 6 Encounters:   09/03/24 257 lb 9.6 oz (116.8 kg)   07/02/24 260 lb (117.9 kg)   06/20/24 264 lb (119.7 kg)   06/19/24 265 lb 3.2 oz (120.3 kg)   06/05/24 264 lb (119.7 kg)   12/21/23 255 lb 12.8 oz (116 kg)     Body mass index is 42.87 kg/m².     Lab Results   Component Value Date     (H) 06/19/2024    GLU 98 05/01/2023     (H) 03/23/2022     Lab Results   Component Value Date    CHOLEST 181 06/19/2024    CHOLEST 198 10/27/2023    CHOLEST 180 05/01/2023     Lab Results   Component Value Date    HDL 52 06/19/2024    HDL 57 10/27/2023    HDL 69 (H) 05/01/2023     Lab Results   Component Value Date     (H) 06/19/2024     10/27/2023    LDL 93 05/01/2023     Lab Results   Component Value Date    AST 38 (H) 06/19/2024    AST 36 10/27/2023    AST 52 (H) 07/03/2023     Lab Results   Component Value Date    ALT 29 06/19/2024    ALT 30 10/27/2023    ALT 42 05/01/2023       Current Outpatient Medications   Medication Sig Dispense Refill     escitalopram 10 MG Oral Tab Take 1 tablet (10 mg total) by mouth daily. 90 tablet 1    HYDROcodone-acetaminophen (NORCO) 5-325 MG Oral Tab Take 1 tablet by mouth every 8 (eight) hours as needed for Pain. 30 tablet 0    metoprolol tartrate 50 MG Oral Tab Take 1 tablet (50 mg total) by mouth 2 (two) times daily. 180 tablet 1    semaglutide 4 MG/3ML Subcutaneous Solution Pen-injector Inject 1 mg into the skin once a week. 3 mL 0    semaglutide (OZEMPIC, 0.25 OR 0.5 MG/DOSE,) 2 MG/3ML Subcutaneous Solution Pen-injector Inject 0.5 mg into the skin once a week. Appt needed. 3 mL 0    ALPRAZolam 0.5 MG Oral Tab TAKE 1 TABLET BY MOUTH 2 TIMES DAILY AS NEEDED FOR AXIETY 30 tablet 0    hydrocortisone 2.5 % External Cream APPLY TO AFFECTED AREA OF BREAST TWICE A DAY AS NEEDED 28.35 g 0    ondansetron (ZOFRAN) 4 mg tablet Take 1 tablet (4 mg total) by mouth every 8 (eight) hours as needed for Nausea. 20 tablet 0    ATORVASTATIN 20 MG Oral Tab TAKE 1 TABLET BY MOUTH EVERY DAY AT NIGHT 90 tablet 0    omega-3 fatty acids 1000 MG Oral Cap Fish Oil 1000 MG Oral Capsule QTY: 0 capsule Days: 0 Refills: 0  Written: 11/02/22 Patient Instructions:      Lutein 6 MG Oral Cap Take 6 mg by mouth daily.      ferrous sulfate 325 (65 FE) MG Oral Tab EC Take 1 tablet (325 mg total) by mouth daily with breakfast. 90 tablet 0    Cholecalciferol (VITAMIN D) 1000 units Oral Tab Take 1 tablet by mouth daily.      diphenhydrAMINE 25 MG Oral Cap Take 1 capsule (25 mg total) by mouth every 6 (six) hours as needed for Sleep.      Acetaminophen-Codeine (TYLENOL WITH CODEINE #3) 300-30 MG Oral Tab Take 1 tablet by mouth every 6 (six) hours as needed for Pain. (Patient not taking: Reported on 9/3/2024) 30 tablet 0    clotrimazole-betamethasone 1-0.05 % External Cream Apply 1 Application topically 2 (two) times daily as needed. 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    Unspecified essential hypertension    Wears glasses      Past Surgical History:   Procedure Laterality Date    Back surgery  11/2007    Colonoscopy  9/21/2009    repeat 5 yrs    Colonoscopy  2011    Colposcopy, cervix w/upper adjacent vagina; w/biopsy(s), cervix  07/14/2020    Foot/toes surgery proc unlisted      rt heel    Leg/ankle surgery proc unlisted  2006,2010    x2 rt ankle    Leg/ankle surgery proc unlisted  2002    surgical repair lt leg-has hardware d/t fx    Lamar localization wire 1 site right (cpt=19281)      Other surgical history  10/2007    spinal discectomy lumbar-L5 S1    Other surgical history      right lumpectomy    Other surgical history  11/2013    R foot sx/fusion at [facility]     Tonsillectomy  1970      Family History   Problem Relation Age of Onset    Heart Disease Father     Hypertension Father     Cancer Father     Other (Other) Father     Breast Cancer Mother 65    Cancer Mother     Hypertension Sister     Hypertension Brother     Diabetes Brother     Stroke Neg       Social History:   Social History     Socioeconomic History    Marital status:    Tobacco Use    Smoking status: Never    Smokeless tobacco: Never   Vaping Use    Vaping status: Never Used   Substance and Sexual Activity    Alcohol use: No    Drug use: No    Sexual activity: Yes     Partners: Male   Other Topics Concern    Caffeine Concern No    Exercise No     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    Exercise: minimal.  Diet: watches sugar closely     REVIEW OF SYSTEMS:   GENERAL: feels well otherwise  SKIN: denies any unusual skin lesions,hx eczema  EYES:denies blurred vision or double vision  HEENT: denies nasal congestion, sinus pain or ST  LUNGS: denies shortness of breath with exertion  CARDIOVASCULAR: denies chest pain on exertion,+HTN  GI: denies abdominal pain,denies heartburn  : denies dysuria, vaginal discharge or itching,periods regular   MUSCULOSKELETAL: denies back pain  NEURO: +migraine headaches  PSYCHE: +depression and anxiety  HEMATOLOGIC: hx of anemia  ENDOCRINE: denies thyroid history  ALL/ASTHMA: denies hx of allergy or asthma    EXAM:   /70 (BP Location: Left arm, Patient Position: Sitting, Cuff Size: adult)   Pulse 78   Temp 96.8 °F (36 °C) (Temporal)   Resp 18   Ht 5' 5\" (1.651 m)   Wt 257 lb 9.6 oz (116.8 kg)   SpO2 96%   BMI 42.87 kg/m²   Body mass index is 42.87 kg/m².   GENERAL: well developed, well nourished,in no apparent distress  SKIN: no rashes,no suspicious lesions  HEENT: atraumatic, normocephalic,ears and throat are clear  EYES:PERRLA, EOMI, normal optic disk,conjunctiva are clear  NECK: supple,no adenopathy,no bruits  CHEST: no chest tenderness  BREAST: no dominant or suspicious mass  LUNGS: clear to auscultation  CARDIO: RRR without murmur  GI: good BS's,no masses, HSM or tenderness  :deferred  MUSCULOSKELETAL: back is not tender,FROM of the back  EXTREMITIES: no cyanosis, clubbing or edema  Bilateral barefoot skin diabetic exam is normal, visualized feet and the appearance is normal.  Bilateral monofilament/sensation of both feet is normal.  Pulsation pedal pulse exam of both lower legs/feet is normal as well.  NEURO: Oriented times three,cranial nerves are intact,motor and sensory are grossly intact  PSYCH:  Speech, mood and affect are appropriate    ASSESSMENT AND PLAN:   Alia Lanza is a 59 year old female who presents for a complete physical  exam.  Order put in for mammogram. Lab tests today.  Pt' s weight is Body mass index is 42.87 kg/m²., recommended low fat diet and aerobic exercise 30 minutes three times weekly.  The patient indicates understanding of these issues and agrees to the plan.  The patient is asked to return for CPX in one year.  1. Anxiety  2. Dysthymia  - stable, continue medication  - escitalopram 10 MG Oral Tab; Take 1 tablet (10 mg total) by mouth daily.  Dispense: 90 tablet; Refill: 1    3. Chronic pain of right ankle  - stable, continue medication as needed  - HYDROcodone-acetaminophen (NORCO) 5-325 MG Oral Tab; Take 1 tablet by mouth every 8 (eight) hours as needed for Pain.  Dispense: 30 tablet; Refill: 0    4. Essential hypertension, benign  Conservative measures dicussed. Continue medication.  Diet and exercise explained and encouraged.  Home blood pressure monitoring. Pt should measure BP’s two to three times per week. Goal blood pressure at home - < 135/85.     - metoprolol tartrate 50 MG Oral Tab; Take 1 tablet (50 mg total) by mouth 2 (two) times daily.  Dispense: 180 tablet; Refill: 1    5. Type 2 diabetes mellitus without complication, without long-term current use of insulin (HCC)  Continue present medications. Encouraged to check sugars daily or as directed.     Fasting labs due one month  Please see your specialists as recommended.  Keep up with regular eye exams and Check feet daily. Stay on your low salt, diabetic diet.  Stay as active as you can.  See me back as scheduled, every 3-6 months.     - Hemoglobin A1C; Future  - semaglutide 4 MG/3ML Subcutaneous Solution Pen-injector; Inject 1 mg into the skin once a week.  Dispense: 3 mL; Refill: 0    6. Kidney stone    - Urology Referral - In Network    7. Routine general medical examination at a health care facility    - Urology Referral - In Network    8. Encounter for screening mammogram for malignant neoplasm of breast    - Urology Referral - In Network  - TABBY HERRERA  2D+3D SCREENING BILAT (CPT=77067/16959); Future    Encounter Diagnoses   Name Primary?    Anxiety     Dysthymia     Chronic pain of right ankle     Essential hypertension, benign     Type 2 diabetes mellitus without complication, without long-term current use of insulin (HCC) Yes    Kidney stone     Routine general medical examination at a health care facility     Encounter for screening mammogram for malignant neoplasm of breast        Orders Placed This Encounter   Procedures    Hemoglobin A1C       Meds & Refills for this Visit:  Requested Prescriptions     Signed Prescriptions Disp Refills    escitalopram 10 MG Oral Tab 90 tablet 1     Sig: Take 1 tablet (10 mg total) by mouth daily.    HYDROcodone-acetaminophen (NORCO) 5-325 MG Oral Tab 30 tablet 0     Sig: Take 1 tablet by mouth every 8 (eight) hours as needed for Pain.    metoprolol tartrate 50 MG Oral Tab 180 tablet 1     Sig: Take 1 tablet (50 mg total) by mouth 2 (two) times daily.    semaglutide 4 MG/3ML Subcutaneous Solution Pen-injector 3 mL 0     Sig: Inject 1 mg into the skin once a week.       Imaging & Consults:  UROLOGY - INTERNAL  TABBY JAVIER 2D+3D SCREENING BILAT (CPT=77067/72392)

## 2024-09-03 ENCOUNTER — LAB ENCOUNTER (OUTPATIENT)
Dept: LAB | Age: 59
End: 2024-09-03
Attending: FAMILY MEDICINE
Payer: COMMERCIAL

## 2024-09-03 ENCOUNTER — OFFICE VISIT (OUTPATIENT)
Dept: INTERNAL MEDICINE CLINIC | Facility: CLINIC | Age: 59
End: 2024-09-03
Payer: COMMERCIAL

## 2024-09-03 VITALS
RESPIRATION RATE: 18 BRPM | WEIGHT: 257.63 LBS | TEMPERATURE: 97 F | BODY MASS INDEX: 42.92 KG/M2 | HEART RATE: 78 BPM | HEIGHT: 65 IN | OXYGEN SATURATION: 96 % | DIASTOLIC BLOOD PRESSURE: 70 MMHG | SYSTOLIC BLOOD PRESSURE: 116 MMHG

## 2024-09-03 DIAGNOSIS — G89.29 CHRONIC PAIN OF RIGHT ANKLE: ICD-10-CM

## 2024-09-03 DIAGNOSIS — I10 ESSENTIAL HYPERTENSION, BENIGN: ICD-10-CM

## 2024-09-03 DIAGNOSIS — F41.9 ANXIETY: ICD-10-CM

## 2024-09-03 DIAGNOSIS — E11.9 TYPE 2 DIABETES MELLITUS WITHOUT COMPLICATION, WITHOUT LONG-TERM CURRENT USE OF INSULIN (HCC): ICD-10-CM

## 2024-09-03 DIAGNOSIS — Z00.00 ROUTINE GENERAL MEDICAL EXAMINATION AT A HEALTH CARE FACILITY: ICD-10-CM

## 2024-09-03 DIAGNOSIS — R74.8 ELEVATED LIVER ENZYMES: Primary | ICD-10-CM

## 2024-09-03 DIAGNOSIS — M25.571 CHRONIC PAIN OF RIGHT ANKLE: ICD-10-CM

## 2024-09-03 DIAGNOSIS — F34.1 DYSTHYMIA: ICD-10-CM

## 2024-09-03 DIAGNOSIS — N20.0 KIDNEY STONE: ICD-10-CM

## 2024-09-03 DIAGNOSIS — R74.8 ELEVATED LIVER ENZYMES: ICD-10-CM

## 2024-09-03 DIAGNOSIS — E11.9 TYPE 2 DIABETES MELLITUS WITHOUT COMPLICATION, WITHOUT LONG-TERM CURRENT USE OF INSULIN (HCC): Primary | ICD-10-CM

## 2024-09-03 DIAGNOSIS — Z12.31 ENCOUNTER FOR SCREENING MAMMOGRAM FOR MALIGNANT NEOPLASM OF BREAST: ICD-10-CM

## 2024-09-03 LAB
AST SERPL-CCNC: 38 U/L (ref ?–34)
EST. AVERAGE GLUCOSE BLD GHB EST-MCNC: 140 MG/DL (ref 68–126)
HBA1C MFR BLD: 6.5 % (ref ?–5.7)

## 2024-09-03 PROCEDURE — 99396 PREV VISIT EST AGE 40-64: CPT | Performed by: FAMILY MEDICINE

## 2024-09-03 PROCEDURE — 3074F SYST BP LT 130 MM HG: CPT | Performed by: FAMILY MEDICINE

## 2024-09-03 PROCEDURE — 84450 TRANSFERASE (AST) (SGOT): CPT | Performed by: FAMILY MEDICINE

## 2024-09-03 PROCEDURE — 83036 HEMOGLOBIN GLYCOSYLATED A1C: CPT | Performed by: FAMILY MEDICINE

## 2024-09-03 PROCEDURE — 99213 OFFICE O/P EST LOW 20 MIN: CPT | Performed by: FAMILY MEDICINE

## 2024-09-03 PROCEDURE — 3051F HG A1C>EQUAL 7.0%<8.0%: CPT | Performed by: FAMILY MEDICINE

## 2024-09-03 PROCEDURE — 3078F DIAST BP <80 MM HG: CPT | Performed by: FAMILY MEDICINE

## 2024-09-03 PROCEDURE — 3008F BODY MASS INDEX DOCD: CPT | Performed by: FAMILY MEDICINE

## 2024-09-03 RX ORDER — ESCITALOPRAM OXALATE 10 MG/1
10 TABLET ORAL DAILY
Qty: 90 TABLET | Refills: 1 | Status: SHIPPED | OUTPATIENT
Start: 2024-09-03

## 2024-09-03 RX ORDER — METOPROLOL TARTRATE 50 MG
50 TABLET ORAL 2 TIMES DAILY
Qty: 180 TABLET | Refills: 1 | Status: SHIPPED | OUTPATIENT
Start: 2024-09-03

## 2024-09-03 RX ORDER — HYDROCODONE BITARTRATE AND ACETAMINOPHEN 5; 325 MG/1; MG/1
1 TABLET ORAL EVERY 8 HOURS PRN
Qty: 30 TABLET | Refills: 0 | Status: SHIPPED | OUTPATIENT
Start: 2024-09-03

## 2024-09-04 ENCOUNTER — TELEPHONE (OUTPATIENT)
Dept: INTERNAL MEDICINE CLINIC | Facility: CLINIC | Age: 59
End: 2024-09-04

## 2024-09-04 LAB
CREATININE, URINE: 206.95 MG/DL
MICROALBUMIN URINE: 2.6 MG/DL (ref 0–17)
MICROALBUMIN/CREATININE RATIO, RANDOM URINE: 12.6

## 2024-09-08 ENCOUNTER — HOSPITAL ENCOUNTER (OUTPATIENT)
Age: 59
Discharge: HOME | End: 2024-09-08
Payer: COMMERCIAL

## 2024-09-08 VITALS
TEMPERATURE: 98 F | OXYGEN SATURATION: 96 % | SYSTOLIC BLOOD PRESSURE: 124 MMHG | DIASTOLIC BLOOD PRESSURE: 78 MMHG | HEART RATE: 78 BPM | RESPIRATION RATE: 16 BRPM

## 2024-09-08 DIAGNOSIS — S61.211A LACERATION OF LEFT INDEX FINGER WITHOUT FOREIGN BODY WITHOUT DAMAGE TO NAIL, INITIAL ENCOUNTER: Primary | ICD-10-CM

## 2024-09-08 PROCEDURE — 99213 OFFICE O/P EST LOW 20 MIN: CPT

## 2024-09-08 PROCEDURE — 12002 RPR S/N/AX/GEN/TRNK2.6-7.5CM: CPT

## 2024-09-08 NOTE — ED INITIAL ASSESSMENT (HPI)
Patient used razor blade tool to open box, laceration observed to left hand, 2nd digit. Bleeding with bending of finger. Up to date on tetanus.

## 2024-09-08 NOTE — ED PROVIDER NOTES
Patient Seen in: [facility]      History     Chief Complaint   Patient presents with    Laceration/Abrasion     Stated Complaint: Laceration    Subjective:   HPI    59-year-old female laceration to the left hand second digit.  Patient reports that it happened prior to arrival with a razor like boxer cutter.  Patient denies any further injury trauma or LOC.  Is up-to-date with tetanus.  Patient chest pain, shortness of breath, cough, abdominal pain, nausea, vomiting or diarrhea.  Afebrile.    Objective:   No pertinent past medical history.          The patient's medication list, past medical history and social history elements  as listed in today's nurse's notes are reviewed and agree.   The patient's family history is reviewed and is noncontributory to the presenting problem, except as indicated as above.     No pertinent past surgical history.              No pertinent social history.            Review of Systems    Positive for stated Chief Complaint: Laceration/Abrasion    Other systems are as noted in HPI.  Constitutional and vital signs reviewed.      All other systems reviewed and negative except as noted above.    Physical Exam     ED Triage Vitals [09/08/24 1214]   /78   Pulse 78   Resp 16   Temp 98 °F (36.7 °C)   Temp src Temporal   SpO2 96 %   O2 Device None (Room air)       Current Vitals:   Vital Signs  BP: 124/78  Pulse: 78  Resp: 16  Temp: 98 °F (36.7 °C)  Temp src: Temporal    Oxygen Therapy  SpO2: 96 %  O2 Device: None (Room air)            Physical Exam  Vitals and nursing note reviewed.   Constitutional:       Appearance: Normal appearance. She is well-developed.   HENT:      Head: Normocephalic.      Right Ear: External ear normal.      Left Ear: External ear normal.      Nose: Nose normal.      Mouth/Throat:      Mouth: Mucous membranes are moist.   Eyes:      Conjunctiva/sclera: Conjunctivae normal.      Pupils: Pupils are equal, round, and reactive to light.    Cardiovascular:      Rate and Rhythm: Normal rate and regular rhythm.      Heart sounds: Normal heart sounds.   Pulmonary:      Effort: Pulmonary effort is normal.      Breath sounds: Normal breath sounds.   Musculoskeletal:      Cervical back: Normal range of motion and neck supple.   Skin:     General: Skin is warm.      Capillary Refill: Capillary refill takes less than 2 seconds.      Comments: L hand 2nd digit: approx 2cm vertical laceration noted across the PIP joint: FROM, N/V intact, tendon function intact, strength 5/5   Neurological:      General: No focal deficit present.      Mental Status: She is alert and oriented to person, place, and time.   Psychiatric:         Mood and Affect: Mood normal.         Behavior: Behavior normal.         Thought Content: Thought content normal.         Judgment: Judgment normal.             ED Course     Anesthesia Type:Lido 3ml  Location:L hand 2nd digit  Size:approx 3cm  Shape:vertical  FB present:NA  Cleansing:NS  Wound Closure:5.0 nylon, 4 interrupted sutures placed  N/V intact:Yes  TendonFunctionIntact:Yes  DressingType:bacitracin/non-adherent/coban  Td:UTD  PT Tolerated:without complaint  After discussing the risks, benefits and alternatives patient expresses understanding and verbal consent.                  MDM   Clinical Impression: L hand 2nd digit  Course of Treatment:   Gently cleanse your finger daily: recommend air drying at night keeping covered during the day.  Look for any signs and symptoms of infection: redness, swelling, streaking, drainage or fevers.  NOTE: Due to location over joint recommend removal in 10-12 days        The patient is encouraged to return if any concerning symptoms arise. Additional verbal discharge instructions are given and discussed. Discharge medications are discussed. The patient is in good condition throughout the visit today and remains so upon discharge. I discuss the plan of care with the patient, who expresses  understanding. All questions and concerns are addressed to the patient's satisfaction prior to discharge today.  Previous conversations with PCP and charts were reviewed.                                           Disposition and Plan     Clinical Impression:  1. Laceration of left index finger without foreign body without damage to nail, initial encounter         Disposition:  Discharge  9/8/2024 12:55 pm    Follow-up:  Felicity Montemayor MD  [address] [address]  [phone]                Medications Prescribed:  Current Discharge Medication List

## 2024-09-08 NOTE — DISCHARGE INSTRUCTIONS
Please return to the ER/clinic if symptoms worsen. Follow-up with your PCP in 24-48 hours as needed.    Gently cleanse your finger daily: recommend air drying at night keeping covered during the day.  Look for any signs and symptoms of infection: redness, swelling, streaking, drainage or fevers.  NOTE: Due to location over joint recommend removal in 10-12 days

## 2024-09-17 DIAGNOSIS — E78.00 PURE HYPERCHOLESTEROLEMIA: ICD-10-CM

## 2024-09-18 RX ORDER — ATORVASTATIN CALCIUM 20 MG/1
20 TABLET, FILM COATED ORAL NIGHTLY
Qty: 90 TABLET | Refills: 1 | Status: SHIPPED | OUTPATIENT
Start: 2024-09-18

## 2024-09-18 NOTE — TELEPHONE ENCOUNTER
Atorvastatin 20 mg  Filled 6-8-24  Qty 90  0 refills  No future appointments.  LOV 6-19-24 SM  Labs 6-19-24 Lipid

## 2024-10-01 DIAGNOSIS — E11.9 TYPE 2 DIABETES MELLITUS WITHOUT COMPLICATION, WITHOUT LONG-TERM CURRENT USE OF INSULIN (HCC): ICD-10-CM

## 2024-10-01 RX ORDER — SEMAGLUTIDE 1.34 MG/ML
1 INJECTION, SOLUTION SUBCUTANEOUS
Qty: 3 ML | Refills: 0 | Status: SHIPPED | OUTPATIENT
Start: 2024-10-01

## 2024-10-29 ENCOUNTER — TELEPHONE (OUTPATIENT)
Dept: INTERNAL MEDICINE CLINIC | Facility: CLINIC | Age: 59
End: 2024-10-29

## 2024-10-30 DIAGNOSIS — E11.9 TYPE 2 DIABETES MELLITUS WITHOUT COMPLICATION, WITHOUT LONG-TERM CURRENT USE OF INSULIN (HCC): ICD-10-CM

## 2024-10-31 RX ORDER — SEMAGLUTIDE 1.34 MG/ML
INJECTION, SOLUTION SUBCUTANEOUS
Refills: 0 | OUTPATIENT
Start: 2024-10-31

## 2024-10-31 NOTE — TELEPHONE ENCOUNTER
Ozmepic 1 mg  Filled 10-1-24  Qty 3 mL  0 refills  No future appointments.  LOV 9-3-24 SM  RTC 3-6 mo.  Labs 9-2-24 A1c

## 2024-11-24 ENCOUNTER — HOSPITAL ENCOUNTER (OUTPATIENT)
Age: 59
Discharge: HOME | End: 2024-11-24
Payer: COMMERCIAL

## 2024-11-24 ENCOUNTER — APPOINTMENT (OUTPATIENT)
Dept: GENERAL RADIOLOGY | Age: 59
End: 2024-11-24
Attending: NURSE PRACTITIONER
Payer: COMMERCIAL

## 2024-11-24 VITALS
DIASTOLIC BLOOD PRESSURE: 52 MMHG | RESPIRATION RATE: 18 BRPM | HEIGHT: 65 IN | OXYGEN SATURATION: 97 % | WEIGHT: 247 LBS | SYSTOLIC BLOOD PRESSURE: 107 MMHG | BODY MASS INDEX: 41.15 KG/M2 | HEART RATE: 85 BPM | TEMPERATURE: 96 F

## 2024-11-24 DIAGNOSIS — S80.02XA CONTUSION OF LEFT KNEE, INITIAL ENCOUNTER: Primary | ICD-10-CM

## 2024-11-24 PROCEDURE — 73560 X-RAY EXAM OF KNEE 1 OR 2: CPT | Performed by: NURSE PRACTITIONER

## 2024-11-24 PROCEDURE — 99214 OFFICE O/P EST MOD 30 MIN: CPT

## 2024-11-24 PROCEDURE — 99213 OFFICE O/P EST LOW 20 MIN: CPT

## 2024-11-24 NOTE — DISCHARGE INSTRUCTIONS
Sprain / Strain supportive care measures:   - Tylenol (500-1000mg) as needed for pain   - Apply ace wrap (or brace) throughout the day as needed   - Ice as tolerated for pain / swelling   - You may benefit from over-the-counter topical pain medication such as: Voltaren (Diclofenac), Icy/Hot, Biofreeze, Bengay, Lidocaine, etc.   - Avoid excessive use of movement / pressure to area until symptoms resolve   - Follow up with your doctor (or orthopedic specialist) as needed

## 2024-11-24 NOTE — ED PROVIDER NOTES
History     Chief Complaint   Patient presents with    Leg or Foot Injury       Subjective:   HPI    Alia DANA Lanza, 59 year old female with notable medical history of obesity, low vitamin D, Hx Left patella fracture w/ hardware who presents with Left knee injury. Patient reports sustaining an accidental mechanical fall 2-days ago with persistent pain. Patient c/f hardware issue given Hx surgery to that knee. Denies hitting head, LOC, other injuries / complaints.        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    Unspecified essential hypertension    Wears glasses              Past Surgical History:   Procedure Laterality Date    Back surgery  11/2007    Colonoscopy  9/21/2009    repeat 5 yrs    Colonoscopy  2011    Colposcopy, cervix w/upper adjacent vagina; w/biopsy(s), cervix  07/14/2020    Foot/toes surgery proc unlisted      rt heel    Leg/ankle surgery proc unlisted  2006,2010    x2 rt ankle    Leg/ankle surgery proc unlisted  2002    surgical repair lt leg-has hardware d/t fx    Lamar localization wire 1 site right (cpt=19281)      Other surgical history  10/2007    spinal discectomy lumbar-L5 S1    Other surgical history      right lumpectomy    Other surgical history  11/2013    R foot sx/fusion at [facility]     Tonsillectomy  1970                Social History     Socioeconomic History    Marital status:    Tobacco Use    Smoking status: Never    Smokeless tobacco:  Never   Vaping Use    Vaping status: Never Used   Substance and Sexual Activity    Alcohol use: No    Drug use: No    Sexual activity: Yes     Partners: Male   Other Topics Concern    Caffeine Concern No    Exercise No              Medications Ordered Prior to Encounter[1]      Review of Systems   Musculoskeletal:  Positive for arthralgias.   All other systems reviewed and are negative.        Constitutional and vital signs reviewed.      All other systems reviewed and negative except as noted above.    I have reviewed the family history, social history, allergies, and outpatient medications.     History reviewed from EMR: Encounters, problem list, allergies, medications      Physical Exam     ED Triage Vitals [11/24/24 1215]   /52   Pulse 85   Resp 18   Temp (!) 96.4 °F (35.8 °C)   Temp src Temporal   SpO2 97 %   O2 Device None (Room air)       Current:/52   Pulse 85   Temp (!) 96.4 °F (35.8 °C) (Temporal)   Resp 18   Ht 165.1 cm (5' 5\")   Wt 112 kg   SpO2 97%   BMI 41.10 kg/m²       Physical Exam  Vitals and nursing note reviewed.   Constitutional:       General: She is not in acute distress.     Appearance: Normal appearance. She is normal weight. She is not ill-appearing or toxic-appearing.   HENT:      Head: Normocephalic and atraumatic.      Right Ear: External ear normal.      Left Ear: External ear normal.      Nose: Nose normal.      Mouth/Throat:      Mouth: Mucous membranes are moist.   Eyes:      Extraocular Movements: Extraocular movements intact.      Conjunctiva/sclera: Conjunctivae normal.      Pupils: Pupils are equal, round, and reactive to light.   Cardiovascular:      Rate and Rhythm: Normal rate.      Pulses: Normal pulses.   Pulmonary:      Effort: Pulmonary effort is normal. No respiratory distress.   Musculoskeletal:         General: No swelling, tenderness or signs of injury. Normal range of motion.      Cervical back: Normal range of motion and neck supple.      Comments:  FROM to Left knee. Mild swelling.   Skin:     General: Skin is warm and dry.      Capillary Refill: Capillary refill takes less than 2 seconds.      Coloration: Skin is not jaundiced.   Neurological:      General: No focal deficit present.      Mental Status: She is alert and oriented to person, place, and time. Mental status is at baseline.   Psychiatric:         Mood and Affect: Mood normal.         Behavior: Behavior normal.         Thought Content: Thought content normal.         Judgment: Judgment normal.            ED Course     Labs Reviewed - No data to display  XR KNEE (1 OR 2 VIEWS), LEFT (CPT=73560)   Final Result   PROCEDURE:  XR KNEE (1 OR 2 VIEWS), LEFT (CPT=73560)       COMPARISON:  ALEJANDRO, XR, XR KNEE ROUTINE (3 VIEWS), LEFT (CPT=73562),    8/02/2015, 3:21 PM.       INDICATIONS:  Fracture       PATIENT STATED HISTORY: (As transcribed by Technologist)  Patient states    that she fell on 11/22/2024 and injured her left knee. Patient has a    laceration and swelling on the anterior aspect of her knee. Patient states    that she has left knee pain. Patient    had left kinee surgery in 2002.            FINDINGS:     Fixation plate and screws in the proximal left tibia are stable.       Moderate loss of patellofemoral joint space with patellar and femoral    condylar osteophytes are noted.  Marginal enthesophytes are identified.                         =====   CONCLUSION:  No fracture or dislocation.  Moderate osteoarthritic changes    have mildly progressed since prior exam.           LOCATION:  [place] [address]           Dictated by (CST): Ge Godoy MD on 11/24/2024 at 1:22 PM        Finalized by (CST): Ge Godoy MD on 11/24/2024 at 1:24 PM             Vitals:    11/24/24 1215   BP: 107/52   Pulse: 85   Resp: 18   Temp: (!) 96.4 °F (35.8 °C)   TempSrc: Temporal   SpO2: 97%   Weight: 112 kg   Height: 165.1 cm (5' 5\")            DARRIN Lanza, 59 year old female with medical  history as noted above who presents with Left knee injury   - Patient in NAD, VSS (temp taken after coming from outside)   - contusion vs sprain vs fracture vs hardware issue vs other   - Xray w/o acute osseous process and stable hardware   - Reassurance provided   - Supportive care discussed    - f/u with care team as needed   - Ambulatory out of ER with steady gait       ** See ED course below for additional information on care provided / interventions / notable events throughout patient's encounter.         ** I have independently reviewed the radiology images, clinical lab results, and ECG tracings as described above (if applicable)    ** Concerning co-morbidities possibly affecting complaint / care: Hx knee fracture and hardware placement    ** See below for home care instructions (if applicable)          Medical Decision Making  Amount and/or Complexity of Data Reviewed  Radiology: ordered and independent interpretation performed. Decision-making details documented in ED Course.    Risk  OTC drugs.        Disposition and Plan     Clinical Impression:  1. Contusion of left knee, initial encounter         Disposition:  Discharge  11/24/2024  1:29 pm    Follow-up:  Felicity Montemayor MD  [address] [address]  [phone]      As needed          Medications Prescribed:  Current Discharge Medication List          The above patient (and/or guardian) was made aware that an appropriate evaluation has been performed, and that no additional testing is required at this time. In my medical judgment, there is currently no evidence of an immediate life-threatening or surgical condition, therefore discharge is indicated at this time. The patient (and/or guardian) was advised that a small risk still exists that a serious condition could develop. The patient was instructed to arrange close follow-up with their primary care provider (or the referral provider given today). The patient received written and  verbal instructions regarding their condition / concerns, demonstrated understanding, and is agreement with the outpatient treatment plan.        Home care instructions:    Sprain / Strain supportive care measures:   - Tylenol (500-1000mg) as needed for pain   - Apply ace wrap (or brace) throughout the day as needed   - Ice as tolerated for pain / swelling   - You may benefit from over-the-counter topical pain medication such as: Voltaren (Diclofenac), Icy/Hot, Biofreeze, Bengay, Lidocaine, etc.   - Avoid excessive use of movement / pressure to area until symptoms resolve   - Follow up with your doctor (or orthopedic specialist) as needed       Scot Landrum, DNP, APRN, AGACNP-BC, FNP-C, CNL  Adult-Gerontology Acute Care & Family Nurse Practitioner  [facility]                   [1]   No current facility-administered medications on file prior to encounter.     Current Outpatient Medications on File Prior to Encounter   Medication Sig Dispense Refill    semaglutide 8 MG/3ML Subcutaneous Solution Pen-injector Inject 2 mg into the skin once a week. APPT DUE 3 mL 0    atorvastatin 20 MG Oral Tab TAKE 1 TABLET BY MOUTH EVERY DAY AT NIGHT 90 tablet 1    escitalopram 10 MG Oral Tab Take 1 tablet (10 mg total) by mouth daily. 90 tablet 1    HYDROcodone-acetaminophen (NORCO) 5-325 MG Oral Tab Take 1 tablet by mouth every 8 (eight) hours as needed for Pain. 30 tablet 0    metoprolol tartrate 50 MG Oral Tab Take 1 tablet (50 mg total) by mouth 2 (two) times daily. 180 tablet 1    ALPRAZolam 0.5 MG Oral Tab TAKE 1 TABLET BY MOUTH 2 TIMES DAILY AS NEEDED FOR AXIETY 30 tablet 0    hydrocortisone 2.5 % External Cream APPLY TO AFFECTED AREA OF BREAST TWICE A DAY AS NEEDED 28.35 g 0    ondansetron (ZOFRAN) 4 mg tablet Take 1 tablet (4 mg total) by mouth every 8 (eight) hours as needed for Nausea. 20 tablet 0    Acetaminophen-Codeine (TYLENOL WITH CODEINE #3) 300-30 MG Oral Tab Take 1 tablet by mouth every 6 (six) hours  as needed for Pain. (Patient not taking: Reported on 9/3/2024) 30 tablet 0    omega-3 fatty acids 1000 MG Oral Cap Fish Oil 1000 MG Oral Capsule QTY: 0 capsule Days: 0 Refills: 0  Written: 11/02/22 Patient Instructions:      Lutein 6 MG Oral Cap Take 6 mg by mouth daily.      clotrimazole-betamethasone 1-0.05 % External Cream Apply 1 Application topically 2 (two) times daily as needed. (Patient not taking: Reported on 6/20/2024) 45 g 0    ferrous sulfate 325 (65 FE) MG Oral Tab EC Take 1 tablet (325 mg total) by mouth daily with breakfast. 90 tablet 0    Cholecalciferol (VITAMIN D) 1000 units Oral Tab Take 1 tablet by mouth daily.      diphenhydrAMINE 25 MG Oral Cap Take 1 capsule (25 mg total) by mouth every 6 (six) hours as needed for Sleep.

## 2024-11-24 NOTE — ED INITIAL ASSESSMENT (HPI)
Mechanical fall on stair on Friday night.  C/o pain to left knee. Denies blood thinners, head injury.

## 2024-11-26 ENCOUNTER — OFFICE VISIT (OUTPATIENT)
Dept: INTERNAL MEDICINE CLINIC | Facility: CLINIC | Age: 59
End: 2024-11-26
Payer: COMMERCIAL

## 2024-11-26 ENCOUNTER — TELEPHONE (OUTPATIENT)
Dept: INTERNAL MEDICINE CLINIC | Facility: CLINIC | Age: 59
End: 2024-11-26

## 2024-11-26 VITALS
WEIGHT: 248.81 LBS | TEMPERATURE: 98 F | DIASTOLIC BLOOD PRESSURE: 68 MMHG | RESPIRATION RATE: 16 BRPM | SYSTOLIC BLOOD PRESSURE: 118 MMHG | OXYGEN SATURATION: 95 % | BODY MASS INDEX: 41.45 KG/M2 | HEART RATE: 88 BPM | HEIGHT: 65 IN

## 2024-11-26 DIAGNOSIS — E11.9 TYPE 2 DIABETES MELLITUS WITHOUT COMPLICATION, WITHOUT LONG-TERM CURRENT USE OF INSULIN (HCC): Primary | ICD-10-CM

## 2024-11-26 DIAGNOSIS — F34.1 DYSTHYMIA: ICD-10-CM

## 2024-11-26 DIAGNOSIS — I10 ESSENTIAL HYPERTENSION, BENIGN: ICD-10-CM

## 2024-11-26 DIAGNOSIS — Z23 NEED FOR PNEUMOCOCCAL VACCINE: ICD-10-CM

## 2024-11-26 DIAGNOSIS — F41.9 ANXIETY: ICD-10-CM

## 2024-11-26 PROCEDURE — 3008F BODY MASS INDEX DOCD: CPT | Performed by: FAMILY MEDICINE

## 2024-11-26 PROCEDURE — 3061F NEG MICROALBUMINURIA REV: CPT | Performed by: FAMILY MEDICINE

## 2024-11-26 PROCEDURE — 90471 IMMUNIZATION ADMIN: CPT | Performed by: FAMILY MEDICINE

## 2024-11-26 PROCEDURE — 3074F SYST BP LT 130 MM HG: CPT | Performed by: FAMILY MEDICINE

## 2024-11-26 PROCEDURE — 90677 PCV20 VACCINE IM: CPT | Performed by: FAMILY MEDICINE

## 2024-11-26 PROCEDURE — 3078F DIAST BP <80 MM HG: CPT | Performed by: FAMILY MEDICINE

## 2024-11-26 PROCEDURE — 99213 OFFICE O/P EST LOW 20 MIN: CPT | Performed by: FAMILY MEDICINE

## 2024-11-26 PROCEDURE — 3044F HG A1C LEVEL LT 7.0%: CPT | Performed by: FAMILY MEDICINE

## 2024-11-26 RX ORDER — ALPRAZOLAM 0.5 MG
TABLET ORAL
Qty: 30 TABLET | Refills: 0 | Status: SHIPPED | OUTPATIENT
Start: 2024-11-26

## 2024-11-26 RX ORDER — LANCETS
1 EACH MISCELLANEOUS DAILY
Qty: 100 EACH | Refills: 0 | Status: SHIPPED | OUTPATIENT
Start: 2024-11-26 | End: 2025-11-26

## 2024-11-26 RX ORDER — BLOOD-GLUCOSE METER
1 EACH MISCELLANEOUS DAILY
Qty: 1 KIT | Refills: 0 | COMMUNITY
Start: 2024-11-26

## 2024-11-26 NOTE — PROGRESS NOTES
HPI:   Alia Lanza is a 59 year old female who presents for recheck of her diabetes.   Pt here to f/u on taking Ozempic. Pt is currently on the 2 mg dose. Pt has lost 0 lbs. Pt denies any shortness of breath, dizziness,fatigue, confusion,headaches,increase heart rate,mood changes,or low blood sugar.  Pt aware also of potential side effects like indigestion, nausea, vomiting, diarrhea or constipation.  Pt has been exercising 4 days per week.   Patient’s FBS have not been done. Pt would like a meter to check her glucose levels.  Last visit with ophthalmologist was 11/6/24.    Last Hgba1c was 6.5 on 9/3/24.    Pt is here for a recheck of anxiety/dysthymia. Has been tolerating the meds well. Denies any side effects from the meds. Mood has been good. Patient's appetite is good.Pt denies headaches,tics,or insomnia.No depressive symptoms or suicidal ideations. Would like to continue the same medication.     Patient presents for recheck of their hypertension. Pt has been taking medications as instructed, no medication side effects, home.  Currently asymptomatic, no chest pains, jaw pains, arm pains. No headaches, dizziness or edema.  No SOB on exertion or rest.  Pt has been following a low fat diet.    BP Readings from Last 3 Encounters:   11/26/24 118/68   11/24/24 107/52   09/08/24 124/78       Wt Readings from Last 6 Encounters:   11/26/24 248 lb 12.8 oz (112.9 kg)   11/24/24 247 lb (112 kg)   09/03/24 257 lb 9.6 oz (116.8 kg)   07/02/24 260 lb (117.9 kg)   06/20/24 264 lb (119.7 kg)   06/19/24 265 lb 3.2 oz (120.3 kg)     Body mass index is 41.4 kg/m².     HEMOGLOBIN A1C (%)   Date Value   10/27/2023 6.3 (A)     HEMOGLOBIN A1c (% of total Hgb)   Date Value   01/03/2017 5.9 (H)     HgbA1C (%)   Date Value   09/03/2024 6.5 (H)   06/19/2024 7.1 (H)   07/03/2023 6.6 (H)     Cholesterol, Total (mg/dL)   Date Value   06/19/2024 181   10/27/2023 198   05/01/2023 180   12/17/2021 200 (H)   08/03/2020 179     CHOLESTEROL,  TOTAL (mg/dL)   Date Value   01/03/2017 199   09/14/2011 189     CHOLESTEROL (mg/dL)   Date Value   07/02/2014 213 (H)   07/17/2013 187   07/06/2012 198     HDL Cholesterol (mg/dL)   Date Value   06/19/2024 52   10/27/2023 57   05/01/2023 69 (H)   12/17/2021 56   08/03/2020 56     HDL CHOLESTEROL (mg/dL)   Date Value   01/03/2017 59   09/14/2011 78     HDL CHOL (mg/dL)   Date Value   07/02/2014 62   07/17/2013 65   07/06/2012 80     LDL Cholesterol (mg/dL)   Date Value   06/19/2024 116 (H)   10/27/2023 123   05/01/2023 93   12/17/2021 126   08/03/2020 104 (H)     LDL-CHOLESTEROL (mg/dL (calc))   Date Value   01/03/2017 115   09/14/2011 95     LDL CHOLESTROL (mg/dL)   Date Value   07/02/2014 132 (H)   07/17/2013 105   07/06/2012 101 (H)     Triglycerides (mg/dL)   Date Value   10/27/2023 84     AST   Date Value   09/03/2024 38 U/L (H)   06/19/2024 38 U/L (H)   10/27/2023 36   07/03/2023 52 U/L (H)   01/03/2017 24 U/L   07/02/2014 25 U/L   07/17/2013 19 U/L   07/06/2012 12 U/L (L)   09/14/2011 14 U/L     ALT   Date Value   06/19/2024 29 U/L   10/27/2023 30   05/01/2023 42 U/L   03/23/2022 41 U/L   01/03/2017 31 U/L (H)   07/02/2014 31 U/L   07/17/2013 29 U/L   07/06/2012 14 U/L   09/14/2011 8 U/L      No results found for: \"MICROALBCREA\"    Current Outpatient Medications   Medication Sig Dispense Refill    ALPRAZolam 0.5 MG Oral Tab TAKE 1 TABLET BY MOUTH 2 TIMES DAILY AS NEEDED FOR AXIETY 30 tablet 0    Glucose Blood (CONTOUR NEXT TEST) In Vitro Strip Test once daily 100 each 0    Accu-Chek Softclix Lancets Does not apply Misc 1 Lancet by Finger stick route daily. Use as directed. 100 each 0    Blood Glucose Monitoring Suppl (CONTOUR NEXT MONITOR) w/Device Does not apply Kit 1 each daily. 1 kit 0    semaglutide 8 MG/3ML Subcutaneous Solution Pen-injector Inject 2 mg into the skin once a week. APPT DUE 3 mL 0    atorvastatin 20 MG Oral Tab TAKE 1 TABLET BY MOUTH EVERY DAY AT NIGHT 90 tablet 1    escitalopram 10 MG  Oral Tab Take 1 tablet (10 mg total) by mouth daily. 90 tablet 1    HYDROcodone-acetaminophen (NORCO) 5-325 MG Oral Tab Take 1 tablet by mouth every 8 (eight) hours as needed for Pain. 30 tablet 0    metoprolol tartrate 50 MG Oral Tab Take 1 tablet (50 mg total) by mouth 2 (two) times daily. 180 tablet 1    hydrocortisone 2.5 % External Cream APPLY TO AFFECTED AREA OF BREAST TWICE A DAY AS NEEDED 28.35 g 0    ondansetron (ZOFRAN) 4 mg tablet Take 1 tablet (4 mg total) by mouth every 8 (eight) hours as needed for Nausea. 20 tablet 0    omega-3 fatty acids 1000 MG Oral Cap Fish Oil 1000 MG Oral Capsule QTY: 0 capsule Days: 0 Refills: 0  Written: 11/02/22 Patient Instructions:      Lutein 6 MG Oral Cap Take 6 mg by mouth daily.      ferrous sulfate 325 (65 FE) MG Oral Tab EC Take 1 tablet (325 mg total) by mouth daily with breakfast. 90 tablet 0    Cholecalciferol (VITAMIN D) 1000 units Oral Tab Take 1 tablet by mouth daily.      diphenhydrAMINE 25 MG Oral Cap Take 1 capsule (25 mg total) by mouth every 6 (six) hours as needed for Sleep.        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    Unspecified essential hypertension    Wears glasses      Past Surgical History:   Procedure Laterality Date    Back surgery  11/2007    Colonoscopy  9/21/2009    repeat 5 yrs    Colonoscopy  2011    Colposcopy, cervix w/upper adjacent vagina; w/biopsy(s), cervix  07/14/2020    Foot/toes surgery proc unlisted       rt heel    Leg/ankle surgery proc unlisted  2006,2010    x2 rt ankle    Leg/ankle surgery proc unlisted  2002    surgical repair lt leg-has hardware d/t fx    Lamar localization wire 1 site right (cpt=19281)      Other surgical history  10/2007    spinal discectomy lumbar-L5 S1    Other surgical history      right lumpectomy    Other surgical history  11/2013    R foot sx/fusion at [facility]     Tonsillectomy  1970      Social History:   Social History     Socioeconomic History    Marital status:    Tobacco Use    Smoking status: Never    Smokeless tobacco: Never   Vaping Use    Vaping status: Never Used   Substance and Sexual Activity    Alcohol use: No    Drug use: No    Sexual activity: Yes     Partners: Male   Other Topics Concern    Caffeine Concern No    Exercise No     Exercise:  4 times per week.  Diet: watches sugar closely and low carb diet     REVIEW OF SYSTEMS:   Constitutional: Negative for fatigue and unexpected weight change.   HENT: Negative for dental problem.    Eyes: Negative for visual disturbance.   Respiratory: Negative for cough and shortness of breath.    Cardiovascular: Negative for chest pain, palpitations and leg swelling.   Gastrointestinal: Negative for nausea, diarrhea and constipation.   Endocrine: Negative for polydipsia, polyphagia and polyuria.   Genitourinary: Negative for dysuria.   Musculoskeletal: Negative for joint pain.   Skin: Negative for rashes  Neurological: Negative for numbness.   Hematological:Negative for hypoglycemia   Psychiatric/Behavioral: Negative for sleep disturbance.    EXAM:   /68 (BP Location: Left arm, Patient Position: Sitting, Cuff Size: adult)   Pulse 88   Temp 97.6 °F (36.4 °C) (Temporal)   Resp 16   Ht 5' 5\" (1.651 m)   Wt 248 lb 12.8 oz (112.9 kg)   SpO2 95%   BMI 41.40 kg/m²   GENERAL: well developed, well nourished,in no apparent distress, pleasant, obese weight.  SKIN: no rashes,no suspicious lesions  NECK: supple,no  adenopathy,no bruits.  No thyromegaly.   LUNGS: clear to auscultation, no W/R/R.  CARDIO: RRR without murmur  EXTREMITIES: no cyanosis, clubbing or edema  NEURO: CN II-XII intact. DTRs 2/4 B/L UE.    PSYCH:  Speech, mood and affect are appropriate    ASSESSMENT AND PLAN:   Alia Lanza is a 59 year old female who presents for a recheck of   Encounter Diagnoses   Name Primary?    Anxiety     Type 2 diabetes mellitus without complication, without long-term current use of insulin (HCC) Yes    Dysthymia     Essential hypertension, benign     Need for pneumococcal vaccine        Orders Placed This Encounter   Procedures    Comp Metabolic Panel (14)    Lipid Panel    Hemoglobin A1C    Prevnar 20 (PCV20) [06663]       Meds & Refills for this Visit:  Requested Prescriptions     Signed Prescriptions Disp Refills    ALPRAZolam 0.5 MG Oral Tab 30 tablet 0     Sig: TAKE 1 TABLET BY MOUTH 2 TIMES DAILY AS NEEDED FOR AXIETY    Glucose Blood (CONTOUR NEXT TEST) In Vitro Strip 100 each 0     Sig: Test once daily    Accu-Chek Softclix Lancets Does not apply Misc 100 each 0     Si Lancet by Finger stick route daily. Use as directed.    Blood Glucose Monitoring Suppl (CONTOUR NEXT MONITOR) w/Device Does not apply Kit 1 kit 0     Si each daily.       Imaging & Consults:  PCV20 VACCINE FOR INTRAMUSCULAR USE    1. Anxiety and 2. Dysthymia   - stable, continue medication as needed  - ALPRAZolam 0.5 MG Oral Tab; TAKE 1 TABLET BY MOUTH 2 TIMES DAILY AS NEEDED FOR AXIETY  Dispense: 30 tablet; Refill: 0    3. Type 2 diabetes mellitus without complication, without long-term current use of insulin (HCC)  Continue present medications. Check sugars daily or as directed.  Eat a low fat, low carb, low cholesterol diet. Call with sugars less than 70 or greater than 300.   Please see your specialists as recommended.  Keep up with regular eye exams and Check feet daily. Stay on your low salt, diabetic diet.  Stay as active as you can.   Labs due March.    - Glucose Blood (CONTOUR NEXT TEST) In Vitro Strip; Test once daily  Dispense: 100 each; Refill: 0  - Accu-Chek Softclix Lancets Does not apply Misc; 1 Lancet by Finger stick route daily. Use as directed.  Dispense: 100 each; Refill: 0  - Blood Glucose Monitoring Suppl (CONTOUR NEXT MONITOR) w/Device Does not apply Kit; 1 each daily.  Dispense: 1 kit; Refill: 0    4. Essential hypertension, benign  Conservative measures dicussed. Continue medication.  Diet and exercise explained and encouraged.  Home blood pressure monitoring. Pt should measure BP’s two to three times per week. Goal blood pressure at home - < 135/85.       5. Need for pneumococcal vaccine    - Prevnar 20 (PCV20) [08624]      The patient indicates understanding of these issues and agrees to the plan.  The patient is asked to return in March 2025.         [1]   Allergies  Allergen Reactions    Diovan [Valsartan]      TABS-difficulty swallowing    Zestril [Lisinopril] NAUSEA AND VOMITING     TABS

## 2024-11-29 NOTE — TELEPHONE ENCOUNTER
Semaglutide 8 mg  Filled 11-1-24  Qty 3 mL  0 refills  Future Appointments   Date Time Provider Department Center   12/13/2024  3:40 PM ZACH[facility] [place]   LOV 11-26-24   RTC 3/2025

## 2024-11-30 RX ORDER — SEMAGLUTIDE 2.68 MG/ML
INJECTION, SOLUTION SUBCUTANEOUS
Qty: 3 ML | Refills: 2 | Status: SHIPPED | OUTPATIENT
Start: 2024-11-30

## 2024-12-13 ENCOUNTER — HOSPITAL ENCOUNTER (OUTPATIENT)
Dept: MAMMOGRAPHY | Age: 59
Discharge: HOME | End: 2024-12-13
Attending: FAMILY MEDICINE
Payer: COMMERCIAL

## 2024-12-13 DIAGNOSIS — Z12.31 ENCOUNTER FOR SCREENING MAMMOGRAM FOR MALIGNANT NEOPLASM OF BREAST: ICD-10-CM

## 2024-12-13 PROCEDURE — 77067 SCR MAMMO BI INCL CAD: CPT | Performed by: FAMILY MEDICINE

## 2024-12-13 PROCEDURE — 77063 BREAST TOMOSYNTHESIS BI: CPT | Performed by: FAMILY MEDICINE

## 2025-02-15 RX ORDER — SEMAGLUTIDE 2.68 MG/ML
2 INJECTION, SOLUTION SUBCUTANEOUS WEEKLY
Qty: 3 ML | Refills: 0 | Status: SHIPPED | OUTPATIENT
Start: 2025-02-15

## 2025-03-06 ENCOUNTER — LAB ENCOUNTER (OUTPATIENT)
Dept: LAB | Age: 60
End: 2025-03-06
Attending: FAMILY MEDICINE
Payer: COMMERCIAL

## 2025-03-06 ENCOUNTER — OFFICE VISIT (OUTPATIENT)
Dept: INTERNAL MEDICINE CLINIC | Facility: CLINIC | Age: 60
End: 2025-03-06
Payer: COMMERCIAL

## 2025-03-06 ENCOUNTER — APPOINTMENT (OUTPATIENT)
Dept: GENERAL RADIOLOGY | Age: 60
End: 2025-03-06
Attending: FAMILY MEDICINE
Payer: COMMERCIAL

## 2025-03-06 ENCOUNTER — HOSPITAL ENCOUNTER (OUTPATIENT)
Dept: GENERAL RADIOLOGY | Age: 60
Discharge: HOME | End: 2025-03-06
Attending: FAMILY MEDICINE
Payer: COMMERCIAL

## 2025-03-06 VITALS
RESPIRATION RATE: 18 BRPM | WEIGHT: 242.19 LBS | SYSTOLIC BLOOD PRESSURE: 114 MMHG | TEMPERATURE: 97 F | DIASTOLIC BLOOD PRESSURE: 72 MMHG | HEIGHT: 65 IN | OXYGEN SATURATION: 98 % | HEART RATE: 104 BPM | BODY MASS INDEX: 40.35 KG/M2

## 2025-03-06 DIAGNOSIS — F41.9 ANXIETY: ICD-10-CM

## 2025-03-06 DIAGNOSIS — M25.512 ACUTE PAIN OF LEFT SHOULDER: ICD-10-CM

## 2025-03-06 DIAGNOSIS — R53.83 FATIGUE, UNSPECIFIED TYPE: ICD-10-CM

## 2025-03-06 DIAGNOSIS — G89.29 CHRONIC PAIN OF RIGHT ANKLE: ICD-10-CM

## 2025-03-06 DIAGNOSIS — E11.9 TYPE 2 DIABETES MELLITUS WITHOUT COMPLICATION, WITHOUT LONG-TERM CURRENT USE OF INSULIN (HCC): ICD-10-CM

## 2025-03-06 DIAGNOSIS — M25.571 CHRONIC PAIN OF RIGHT ANKLE: ICD-10-CM

## 2025-03-06 DIAGNOSIS — E11.9 TYPE 2 DIABETES MELLITUS WITHOUT COMPLICATION, WITHOUT LONG-TERM CURRENT USE OF INSULIN (HCC): Primary | ICD-10-CM

## 2025-03-06 DIAGNOSIS — I10 ESSENTIAL HYPERTENSION, BENIGN: ICD-10-CM

## 2025-03-06 DIAGNOSIS — E78.00 PURE HYPERCHOLESTEROLEMIA: ICD-10-CM

## 2025-03-06 LAB
BASOPHILS # BLD AUTO: 0.05 X10(3) UL (ref 0–0.2)
BASOPHILS NFR BLD AUTO: 0.6 %
CREAT UR-SCNC: 176.6 MG/DL
EOSINOPHIL # BLD AUTO: 0.23 X10(3) UL (ref 0–0.7)
EOSINOPHIL NFR BLD AUTO: 2.7 %
ERYTHROCYTE [DISTWIDTH] IN BLOOD BY AUTOMATED COUNT: 14 %
HCT VFR BLD AUTO: 40.6 %
HGB BLD-MCNC: 13.2 G/DL
IMM GRANULOCYTES # BLD AUTO: 0.03 X10(3) UL (ref 0–1)
IMM GRANULOCYTES NFR BLD: 0.3 %
IRON SATN MFR SERPL: 20 %
IRON SERPL-MCNC: 65 UG/DL
LYMPHOCYTES # BLD AUTO: 1.97 X10(3) UL (ref 1–4)
LYMPHOCYTES NFR BLD AUTO: 23 %
MCH RBC QN AUTO: 28.8 PG (ref 26–34)
MCHC RBC AUTO-ENTMCNC: 32.5 G/DL (ref 31–37)
MCV RBC AUTO: 88.6 FL
MICROALBUMIN UR-MCNC: 28.7 MG/DL
MICROALBUMIN/CREAT 24H UR-RTO: 162.5 UG/MG (ref ?–30)
MONOCYTES # BLD AUTO: 0.62 X10(3) UL (ref 0.1–1)
MONOCYTES NFR BLD AUTO: 7.2 %
NEUTROPHILS # BLD AUTO: 5.68 X10 (3) UL (ref 1.5–7.7)
NEUTROPHILS # BLD AUTO: 5.68 X10(3) UL (ref 1.5–7.7)
NEUTROPHILS NFR BLD AUTO: 66.2 %
PLATELET # BLD AUTO: 350 10(3)UL (ref 150–450)
RBC # BLD AUTO: 4.58 X10(6)UL
TOTAL IRON BINDING CAPACITY: 323 UG/DL (ref 250–425)
TRANSFERRIN SERPL-MCNC: 241 MG/DL (ref 250–380)
WBC # BLD AUTO: 8.6 X10(3) UL (ref 4–11)

## 2025-03-06 PROCEDURE — 36415 COLL VENOUS BLD VENIPUNCTURE: CPT

## 2025-03-06 PROCEDURE — 73030 X-RAY EXAM OF SHOULDER: CPT | Performed by: FAMILY MEDICINE

## 2025-03-06 PROCEDURE — 99214 OFFICE O/P EST MOD 30 MIN: CPT | Performed by: FAMILY MEDICINE

## 2025-03-06 PROCEDURE — 3074F SYST BP LT 130 MM HG: CPT | Performed by: FAMILY MEDICINE

## 2025-03-06 PROCEDURE — 85025 COMPLETE CBC W/AUTO DIFF WBC: CPT

## 2025-03-06 PROCEDURE — 83550 IRON BINDING TEST: CPT

## 2025-03-06 PROCEDURE — 82570 ASSAY OF URINE CREATININE: CPT

## 2025-03-06 PROCEDURE — 82043 UR ALBUMIN QUANTITATIVE: CPT

## 2025-03-06 PROCEDURE — 3008F BODY MASS INDEX DOCD: CPT | Performed by: FAMILY MEDICINE

## 2025-03-06 PROCEDURE — 83540 ASSAY OF IRON: CPT

## 2025-03-06 PROCEDURE — 3078F DIAST BP <80 MM HG: CPT | Performed by: FAMILY MEDICINE

## 2025-03-06 RX ORDER — METHYLPREDNISOLONE 4 MG/1
TABLET ORAL
Qty: 21 TABLET | Refills: 0 | Status: SHIPPED | OUTPATIENT
Start: 2025-03-06

## 2025-03-06 RX ORDER — ALPRAZOLAM 0.5 MG
TABLET ORAL
Qty: 30 TABLET | Refills: 0 | Status: SHIPPED | OUTPATIENT
Start: 2025-03-06

## 2025-03-06 RX ORDER — HYDROCODONE BITARTRATE AND ACETAMINOPHEN 5; 325 MG/1; MG/1
1 TABLET ORAL EVERY 8 HOURS PRN
Qty: 30 TABLET | Refills: 0 | Status: SHIPPED | OUTPATIENT
Start: 2025-03-06

## 2025-03-06 NOTE — PROGRESS NOTES
HPI:   Alia Lanza is a 59 year old female who presents for recheck of her diabetes.   Patient’s FBS have been .  Pt tests her blood 1x per week on Sunday morning..  Last visit with ophthalmologist was 3 years ago, plans to schedule.    Pt has not been checking her feet on a regular basis. Pt denies any tingling of the feet.   Pt here to f/u on taking Ozempic. Pt is currently on the 2 mg dose. Pt has lost 6 lbs. Pt denies any shortness of breath, dizziness,fatigue, confusion,headaches,increase heart rate,mood changes,or low blood sugar.  Pt aware also of potential side effects like indigestion, nausea, vomiting, diarrhea or constipation.    HTN recheck, denies checking BP at home regularly. Reports she will check her BP if she is not feeling well but that does not occur frequently. Denies CP, SOB, dizziness, HA, blurry vision.    Hyperlipidemia recheck, currently taking atorvastatin without issue. Denies muscle aches or weakness, denies joint aches.     The patient has complaints of left  shoulder pain. Pain started 1 week ago. Inciting event was throwing a heavy bag of garbage into can. Pt will occasionally have pain radiating into the elbow. Pt denies tingling in the arm/hand. Pt reports weakness in the arm. Pain is worsened by laying, lifting arm. Pain is lessened by heat and norco minimally helps. Ibuprofen without relief    Also reports feeling more tired over the last few weeks. States she has kidney stone on the right for several months and has hematuria and is concerned her hemoglobin is low.    Wt Readings from Last 6 Encounters:   03/06/25 242 lb 3.2 oz (109.9 kg)   11/26/24 248 lb 12.8 oz (112.9 kg)   11/24/24 247 lb (112 kg)   09/03/24 257 lb 9.6 oz (116.8 kg)   07/02/24 260 lb (117.9 kg)   06/20/24 264 lb (119.7 kg)     Body mass index is 40.3 kg/m².     HEMOGLOBIN A1C (%)   Date Value   10/27/2023 6.3 (A)     HEMOGLOBIN A1c (% of total Hgb)   Date Value   01/03/2017 5.9 (H)     HgbA1C (%)    Date Value   09/03/2024 6.5 (H)   06/19/2024 7.1 (H)   07/03/2023 6.6 (H)     Cholesterol, Total (mg/dL)   Date Value   06/19/2024 181   10/27/2023 198   05/01/2023 180   12/17/2021 200 (H)   08/03/2020 179     CHOLESTEROL, TOTAL (mg/dL)   Date Value   01/03/2017 199   09/14/2011 189     CHOLESTEROL (mg/dL)   Date Value   07/02/2014 213 (H)   07/17/2013 187   07/06/2012 198     HDL Cholesterol (mg/dL)   Date Value   06/19/2024 52   10/27/2023 57   05/01/2023 69 (H)   12/17/2021 56   08/03/2020 56     HDL CHOLESTEROL (mg/dL)   Date Value   01/03/2017 59   09/14/2011 78     HDL CHOL (mg/dL)   Date Value   07/02/2014 62   07/17/2013 65   07/06/2012 80     LDL Cholesterol (mg/dL)   Date Value   06/19/2024 116 (H)   10/27/2023 123   05/01/2023 93   12/17/2021 126   08/03/2020 104 (H)     LDL-CHOLESTEROL (mg/dL (calc))   Date Value   01/03/2017 115   09/14/2011 95     LDL CHOLESTROL (mg/dL)   Date Value   07/02/2014 132 (H)   07/17/2013 105   07/06/2012 101 (H)     Triglycerides (mg/dL)   Date Value   10/27/2023 84     AST   Date Value   09/03/2024 38 U/L (H)   06/19/2024 38 U/L (H)   10/27/2023 36   07/03/2023 52 U/L (H)   01/03/2017 24 U/L   07/02/2014 25 U/L   07/17/2013 19 U/L   07/06/2012 12 U/L (L)   09/14/2011 14 U/L     ALT   Date Value   06/19/2024 29 U/L   10/27/2023 30   05/01/2023 42 U/L   03/23/2022 41 U/L   01/03/2017 31 U/L (H)   07/02/2014 31 U/L   07/17/2013 29 U/L   07/06/2012 14 U/L   09/14/2011 8 U/L      No results found for: \"MICROALBCREA\"    Current Outpatient Medications   Medication Sig Dispense Refill    ALPRAZolam 0.5 MG Oral Tab TAKE 1 TABLET BY MOUTH 2 TIMES DAILY AS NEEDED FOR AXIETY 30 tablet 0    HYDROcodone-acetaminophen (NORCO) 5-325 MG Oral Tab Take 1 tablet by mouth every 8 (eight) hours as needed for Pain. 30 tablet 0    methylPREDNISolone (MEDROL) 4 MG Oral Tablet Therapy Pack As directed. 21 tablet 0    semaglutide (OZEMPIC, 2 MG/DOSE,) 8 MG/3ML Subcutaneous Solution Pen-injector  Inject 2 mg into the skin once a week. APPT AND LABS DUE MARCH 3 mL 0    Glucose Blood (CONTOUR NEXT TEST) In Vitro Strip Test once daily 100 each 0    Accu-Chek Softclix Lancets Does not apply Misc 1 Lancet by Finger stick route daily. Use as directed. 100 each 0    Blood Glucose Monitoring Suppl (CONTOUR NEXT MONITOR) w/Device Does not apply Kit 1 each daily. 1 kit 0    atorvastatin 20 MG Oral Tab TAKE 1 TABLET BY MOUTH EVERY DAY AT NIGHT 90 tablet 1    escitalopram 10 MG Oral Tab Take 1 tablet (10 mg total) by mouth daily. 90 tablet 1    metoprolol tartrate 50 MG Oral Tab Take 1 tablet (50 mg total) by mouth 2 (two) times daily. 180 tablet 1    hydrocortisone 2.5 % External Cream APPLY TO AFFECTED AREA OF BREAST TWICE A DAY AS NEEDED 28.35 g 0    ondansetron (ZOFRAN) 4 mg tablet Take 1 tablet (4 mg total) by mouth every 8 (eight) hours as needed for Nausea. 20 tablet 0    omega-3 fatty acids 1000 MG Oral Cap Fish Oil 1000 MG Oral Capsule QTY: 0 capsule Days: 0 Refills: 0  Written: 11/02/22 Patient Instructions:      Lutein 6 MG Oral Cap Take 6 mg by mouth daily.      ferrous sulfate 325 (65 FE) MG Oral Tab EC Take 1 tablet (325 mg total) by mouth daily with breakfast. 90 tablet 0    Cholecalciferol (VITAMIN D) 1000 units Oral Tab Take 1 tablet by mouth daily.      diphenhydrAMINE 25 MG Oral Cap Take 1 capsule (25 mg total) by mouth every 6 (six) hours as needed for Sleep.        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    Hemorrhoids    High cholesterol    Hereditary    History of depression    Post partum    Human papilloma virus infection    Lipid screening    KIM (obstructive sleep apnea)    AHI 14.3 O2 Lm 81% Supine AHI 16.6 Non-supine AHI 12.5     Other and unspecified hyperlipidemia    Pain in joints    Ankle injury    Stress     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    Unspecified essential hypertension    Wears glasses      Past Surgical History:   Procedure Laterality Date    Back surgery  11/2007    Colonoscopy  9/21/2009    repeat 5 yrs    Colonoscopy  2011    Colposcopy, cervix w/upper adjacent vagina; w/biopsy(s), cervix  07/14/2020    Foot/toes surgery proc unlisted      rt heel    Leg/ankle surgery proc unlisted  2006,2010    x2 rt ankle    Leg/ankle surgery proc unlisted  2002    surgical repair lt leg-has hardware d/t fx    Lamar localization wire 1 site right (cpt=19281)      Other surgical history  10/2007    spinal discectomy lumbar-L5 S1    Other surgical history      right lumpectomy    Other surgical history  11/2013    R foot sx/fusion at [facility]     Tonsillectomy  1970      Social History:   Social History     Socioeconomic History    Marital status:    Tobacco Use    Smoking status: Never    Smokeless tobacco: Never   Vaping Use    Vaping status: Never Used   Substance and Sexual Activity    Alcohol use: No    Drug use: No    Sexual activity: Yes     Partners: Male   Other Topics Concern    Caffeine Concern No    Exercise No        REVIEW OF SYSTEMS:   Constitutional: Negative for unexpected weight change. +fatigue  HENT: Negative for dental problem.    Eyes: Negative for visual disturbance.   Respiratory: Negative for cough and shortness of breath.    Cardiovascular: Negative for chest pain, palpitations and leg swelling.   Gastrointestinal: Negative for nausea, diarrhea and constipation.   Endocrine: Negative for polydipsia, polyphagia and polyuria.   Genitourinary: Negative for dysuria. +hematuria  Musculoskeletal: Negative for joint pain.  +right shoulder pain  Skin: Negative for rashes  Neurological: Negative for numbness.   Hematological:Negative for hypoglycemia   Psychiatric/Behavioral: Negative for sleep disturbance.    EXAM:   /72 (BP Location: Left arm, Patient Position: Sitting, Cuff Size: adult)    Pulse 104   Temp 97 °F (36.1 °C) (Temporal)   Resp 18   Ht 5' 5\" (1.651 m)   Wt 242 lb 3.2 oz (109.9 kg)   SpO2 98%   BMI 40.30 kg/m²   GENERAL: well developed, well nourished,in no apparent distress, pleasant, decreased weight.  SKIN: no rashes,no suspicious lesions  LUNGS: clear to auscultation, no W/R/R.  CARDIO: RRR without murmur  GI: BS x4, normoactive, no masses or HSM.  Soft without distention or tenderness  : no CVAT, no distension of bladder  EXTREMITIES: no cyanosis, clubbing or edema  SHOULDER: +LROM to left shoulder d/t to pain, swelling noted to lateral left shoulder, sensation intact,  strong  FOOT EXAM: Shoes and socks were removed-- Bilateral barefoot skin diabetic exam is normal, visualized feet and the appearance is normal.  Bilateral monofilament/sensation of both feet is normal.  Pulsation pedal pulse exam of both lower legs/feet is normal as well.    ASSESSMENT AND PLAN:   Alia Lanza is a 59 year old female who presents for a recheck of:  Encounter Diagnoses   Name Primary?    Type 2 diabetes mellitus without complication, without long-term current use of insulin (HCC) Yes    Pure hypercholesterolemia     Essential hypertension, benign     Anxiety     Chronic pain of right ankle     Acute pain of left shoulder     Fatigue, unspecified type        Orders Placed This Encounter   Procedures    Microalb/Creat Ratio, Random Urine    CBC W Differential W Platelet [E]    Iron And Tibc [E]       Meds & Refills for this Visit:  Requested Prescriptions     Signed Prescriptions Disp Refills    ALPRAZolam 0.5 MG Oral Tab 30 tablet 0     Sig: TAKE 1 TABLET BY MOUTH 2 TIMES DAILY AS NEEDED FOR AXIETY    HYDROcodone-acetaminophen (NORCO) 5-325 MG Oral Tab 30 tablet 0     Sig: Take 1 tablet by mouth every 8 (eight) hours as needed for Pain.    methylPREDNISolone (MEDROL) 4 MG Oral Tablet Therapy Pack 21 tablet 0     Sig: As directed.       Imaging & Consults:  XR SHOULDER, COMPLETE (MIN 2  VIEWS), LEFT (CPT=73030)      1. Type 2 diabetes mellitus without complication, without long-term current use of insulin (HCC)  - continue to check blood sugars, continue medications  - will check labs today   Continue to see specialists as recommended.  Keep up with regular eye exams and see the podiatrist because of your diabetes, regularly.  Check feet daily. Stay on your low salt, diabetic diet.  Stay as active as you can.  - Microalb/Creat Ratio, Random Urine; Future    2. Pure hypercholesterolemia  - continue medications  - will check labs today    3. Essential hypertension, benign  - BPs have been stable, patient would like to decrease metoprolol dose  - will decrease metoprolol to 25mg BID, return in 1 month for BP check  - will check labs today     4. Anxiety  - stable, take medication as needed  - ALPRAZolam 0.5 MG Oral Tab; TAKE 1 TABLET BY MOUTH 2 TIMES DAILY AS NEEDED FOR AXIETY  Dispense: 30 tablet; Refill: 0    5. Chronic pain of right ankle  - stable, take medication as needed  - HYDROcodone-acetaminophen (NORCO) 5-325 MG Oral Tab; Take 1 tablet by mouth every 8 (eight) hours as needed for Pain.  Dispense: 30 tablet; Refill: 0    6. Acute pain of left shoulder  - will image as below  - may start steroid pack and use norco PRN   - educated patient on hyperglycemia risk on steroid pack  - XR SHOULDER, COMPLETE (MIN 2 VIEWS), LEFT (CPT=73030); Future  - methylPREDNISolone (MEDROL) 4mg oral tablet therapy pack  - HYDROcodone-acetaminophen (NORCO) 5-325mg     7. Fatigue, unspecified type  - possibly due to kidney stones, will check labs, patient to follow up with urology  - CBC W Differential W Platelet [E]; Future  - Iron And Tibc [E]; Future      The patient indicates understanding of these issues and agrees to the plan.  The patient is asked to return in 1 month for BP check.         [1]   Allergies  Allergen Reactions    Diovan [Valsartan]      TABS-difficulty swallowing    Zestril [Lisinopril] NAUSEA  AND VOMITING     TABS

## 2025-03-08 DIAGNOSIS — E11.9 TYPE 2 DIABETES MELLITUS WITHOUT COMPLICATION, WITHOUT LONG-TERM CURRENT USE OF INSULIN (HCC): ICD-10-CM

## 2025-03-08 RX ORDER — LANCETS
1 EACH MISCELLANEOUS DAILY
Qty: 100 EACH | Refills: 2 | Status: SHIPPED | OUTPATIENT
Start: 2025-03-08 | End: 2026-03-08

## 2025-03-13 RX ORDER — SEMAGLUTIDE 2.68 MG/ML
2 INJECTION, SOLUTION SUBCUTANEOUS WEEKLY
Qty: 3 ML | Refills: 0 | Status: SHIPPED | OUTPATIENT
Start: 2025-03-13

## 2025-03-14 DIAGNOSIS — E78.00 PURE HYPERCHOLESTEROLEMIA: ICD-10-CM

## 2025-03-15 RX ORDER — ATORVASTATIN CALCIUM 20 MG/1
20 TABLET, FILM COATED ORAL NIGHTLY
Qty: 90 TABLET | Refills: 1 | OUTPATIENT
Start: 2025-03-15

## 2025-03-30 DIAGNOSIS — I10 ESSENTIAL HYPERTENSION, BENIGN: ICD-10-CM

## 2025-03-30 RX ORDER — METOPROLOL TARTRATE 50 MG
50 TABLET ORAL 2 TIMES DAILY
Qty: 180 TABLET | Refills: 1 | Status: SHIPPED | OUTPATIENT
Start: 2025-03-30

## 2025-04-09 RX ORDER — SEMAGLUTIDE 2.68 MG/ML
2 INJECTION, SOLUTION SUBCUTANEOUS WEEKLY
Qty: 3 ML | Refills: 0 | Status: SHIPPED | OUTPATIENT
Start: 2025-04-09

## 2025-04-09 NOTE — TELEPHONE ENCOUNTER
Ozempic 2 mg    Diabetes Medication Protocol [id number]/09/2025 12:07 AM   Protocol Details Last A1C < 7.5 and within past 6 months      Filled 3-13-25  Qty 3 mL  0 refills  No future appointments.  LOV 3-6-25   Labs due for repeat lab work

## 2025-04-14 ENCOUNTER — TELEPHONE (OUTPATIENT)
Dept: INTERNAL MEDICINE CLINIC | Facility: CLINIC | Age: 60
End: 2025-04-14

## 2025-04-18 DIAGNOSIS — F34.1 DYSTHYMIA: ICD-10-CM

## 2025-04-18 DIAGNOSIS — F41.9 ANXIETY: ICD-10-CM

## 2025-04-19 RX ORDER — ESCITALOPRAM OXALATE 10 MG/1
10 TABLET ORAL DAILY
Qty: 90 TABLET | Refills: 1 | Status: SHIPPED | OUTPATIENT
Start: 2025-04-19

## 2025-05-05 RX ORDER — SEMAGLUTIDE 2.68 MG/ML
2 INJECTION, SOLUTION SUBCUTANEOUS WEEKLY
Qty: 3 ML | Refills: 0 | Status: SHIPPED | OUTPATIENT
Start: 2025-05-05

## 2025-05-05 NOTE — TELEPHONE ENCOUNTER
MCM sent to pt to complete labs asap    Ozempic 2 mg    Diabetes Medication Protocol [id number]/05/2025 12:05 AM   Protocol Details Last A1C < 7.5 and within past 6 months      Filled 4-9-25  Qty 3 mL  0 refills  No future appointments.  LOV 3-6-25   Labs due for lab work

## 2025-05-08 ENCOUNTER — TELEPHONE (OUTPATIENT)
Dept: INTERNAL MEDICINE CLINIC | Facility: CLINIC | Age: 60
End: 2025-05-08

## 2025-05-16 ENCOUNTER — TELEPHONE (OUTPATIENT)
Dept: INTERNAL MEDICINE CLINIC | Facility: CLINIC | Age: 60
End: 2025-05-16

## 2025-05-23 ENCOUNTER — TELEPHONE (OUTPATIENT)
Dept: INTERNAL MEDICINE CLINIC | Facility: CLINIC | Age: 60
End: 2025-05-23

## 2025-05-23 NOTE — TELEPHONE ENCOUNTER
Incoming fax from [facility]    Discharge summary from 5/21/2025    Placed in  in basket for review

## 2025-05-31 RX ORDER — SEMAGLUTIDE 2.68 MG/ML
2 INJECTION, SOLUTION SUBCUTANEOUS WEEKLY
Qty: 3 ML | Refills: 0 | Status: SHIPPED | OUTPATIENT
Start: 2025-05-31

## 2025-07-05 ENCOUNTER — PATIENT MESSAGE (OUTPATIENT)
Dept: INTERNAL MEDICINE CLINIC | Facility: CLINIC | Age: 60
End: 2025-07-05

## 2025-07-07 NOTE — TELEPHONE ENCOUNTER
TO: Please see MCM and attached blood work for upcoming appointment 7/8/2025. TY    Future Appointments   Date Time Provider Department Center   7/8/2025  1:00 PM Irena Hays APRN [facility] [place]

## 2025-07-08 ENCOUNTER — OFFICE VISIT (OUTPATIENT)
Dept: INTERNAL MEDICINE CLINIC | Facility: CLINIC | Age: 60
End: 2025-07-08
Payer: COMMERCIAL

## 2025-07-08 VITALS
RESPIRATION RATE: 16 BRPM | DIASTOLIC BLOOD PRESSURE: 68 MMHG | HEIGHT: 65 IN | SYSTOLIC BLOOD PRESSURE: 118 MMHG | HEART RATE: 74 BPM | WEIGHT: 239.81 LBS | TEMPERATURE: 98 F | OXYGEN SATURATION: 98 % | BODY MASS INDEX: 39.96 KG/M2

## 2025-07-08 DIAGNOSIS — E11.9 TYPE 2 DIABETES MELLITUS WITHOUT COMPLICATION, WITHOUT LONG-TERM CURRENT USE OF INSULIN (HCC): ICD-10-CM

## 2025-07-08 DIAGNOSIS — F41.9 ANXIETY: ICD-10-CM

## 2025-07-08 DIAGNOSIS — F34.1 DYSTHYMIA: ICD-10-CM

## 2025-07-08 DIAGNOSIS — M25.571 CHRONIC PAIN OF RIGHT ANKLE: ICD-10-CM

## 2025-07-08 DIAGNOSIS — Z00.00 LABORATORY EXAMINATION ORDERED AS PART OF A ROUTINE GENERAL MEDICAL EXAMINATION: ICD-10-CM

## 2025-07-08 DIAGNOSIS — E55.9 VITAMIN D DEFICIENCY: ICD-10-CM

## 2025-07-08 DIAGNOSIS — G89.29 CHRONIC PAIN OF RIGHT ANKLE: ICD-10-CM

## 2025-07-08 DIAGNOSIS — R11.0 NAUSEA: ICD-10-CM

## 2025-07-08 DIAGNOSIS — I10 ESSENTIAL HYPERTENSION, BENIGN: ICD-10-CM

## 2025-07-08 DIAGNOSIS — L30.9 ECZEMA, UNSPECIFIED TYPE: ICD-10-CM

## 2025-07-08 DIAGNOSIS — E78.00 PURE HYPERCHOLESTEROLEMIA: Primary | ICD-10-CM

## 2025-07-08 PROCEDURE — 3078F DIAST BP <80 MM HG: CPT | Performed by: FAMILY MEDICINE

## 2025-07-08 PROCEDURE — 3061F NEG MICROALBUMINURIA REV: CPT | Performed by: FAMILY MEDICINE

## 2025-07-08 PROCEDURE — 99214 OFFICE O/P EST MOD 30 MIN: CPT | Performed by: FAMILY MEDICINE

## 2025-07-08 PROCEDURE — 3074F SYST BP LT 130 MM HG: CPT | Performed by: FAMILY MEDICINE

## 2025-07-08 PROCEDURE — 3008F BODY MASS INDEX DOCD: CPT | Performed by: FAMILY MEDICINE

## 2025-07-08 PROCEDURE — 3060F POS MICROALBUMINURIA REV: CPT | Performed by: FAMILY MEDICINE

## 2025-07-08 RX ORDER — HYDROCODONE BITARTRATE AND ACETAMINOPHEN 5; 325 MG/1; MG/1
1 TABLET ORAL EVERY 8 HOURS PRN
Qty: 30 TABLET | Refills: 0 | Status: SHIPPED | OUTPATIENT
Start: 2025-07-08

## 2025-07-08 RX ORDER — ONDANSETRON 4 MG/1
4 TABLET, FILM COATED ORAL EVERY 8 HOURS PRN
Qty: 20 TABLET | Refills: 0 | Status: SHIPPED | OUTPATIENT
Start: 2025-07-08

## 2025-07-08 RX ORDER — ESCITALOPRAM OXALATE 10 MG/1
10 TABLET ORAL DAILY
Qty: 90 TABLET | Refills: 0 | Status: SHIPPED | OUTPATIENT
Start: 2025-07-08

## 2025-07-08 RX ORDER — SEMAGLUTIDE 2.68 MG/ML
2 INJECTION, SOLUTION SUBCUTANEOUS WEEKLY
Qty: 3 ML | Refills: 1 | Status: SHIPPED | OUTPATIENT
Start: 2025-07-08

## 2025-07-08 RX ORDER — ALPRAZOLAM 0.5 MG
TABLET ORAL
Qty: 30 TABLET | Refills: 0 | Status: SHIPPED | OUTPATIENT
Start: 2025-07-08

## 2025-07-08 RX ORDER — LOSARTAN POTASSIUM 25 MG/1
TABLET ORAL
Qty: 45 TABLET | Refills: 2 | Status: SHIPPED | OUTPATIENT
Start: 2025-07-08

## 2025-07-08 RX ORDER — HYDROCORTISONE 25 MG/G
CREAM TOPICAL
Qty: 28.35 G | Refills: 0 | Status: SHIPPED | OUTPATIENT
Start: 2025-07-08

## 2025-07-08 NOTE — PROGRESS NOTES
HPI:   Alia Lanza is a 59 year old female who presents for recheck of her diabetes.   Patient’s FBS have been .  Pt tests her blood 1 times per week  Last visit with ophthalmologist was 11/6/24.    Pt has been checking her feet on a regular basis. Pt denies any tingling of the feet.   Pt here to f/u on taking Ozempic. Pt is currently on the 2 mg dose. Pt has lost 3 lbs. Pt denies any shortness of breath, dizziness,fatigue, confusion,headaches,increase heart rate,mood changes,or low blood sugar.  Pt aware also of potential side effects like indigestion, nausea, vomiting, diarrhea or constipation.     HTN recheck, denies checking BP at home regularly. Reports she will check her BP if she is not feeling well but that does not occur frequently. Denies CP, SOB, dizziness, HA, blurry vision.     Hyperlipidemia recheck, currently taking atorvastatin without issue. Denies muscle aches or weakness, denies joint aches.    Pt is here for a recheck of anxiety/dysthymia. Has been tolerating the meds well. Denies any side effects from the meds. Mood has been good. Patient's appetite is good.Pt denies headaches,tics,or insomnia.No depressive symptoms or suicidal ideations. Would like to continue the same medication.     Patient also needs a refill on the [place] for chronic pain in right ankle. Pt uses the pain medication as needed. Last refill was 3/6/25.    Patient also needs a refill on the steroid cream for her eczema.Currently it is under control.      Wt Readings from Last 6 Encounters:   07/08/25 239 lb 12.8 oz (108.8 kg)   03/06/25 242 lb 3.2 oz (109.9 kg)   11/26/24 248 lb 12.8 oz (112.9 kg)   11/24/24 247 lb (112 kg)   09/03/24 257 lb 9.6 oz (116.8 kg)   07/02/24 260 lb (117.9 kg)     Body mass index is 39.9 kg/m².     HEMOGLOBIN A1C (%)   Date Value   10/27/2023 6.3 (A)     HEMOGLOBIN A1c (% of total Hgb)   Date Value   01/03/2017 5.9 (H)     HgbA1C (%)   Date Value   09/03/2024 6.5 (H)   06/19/2024 7.1 (H)    07/03/2023 6.6 (H)     Cholesterol, Total (mg/dL)   Date Value   06/19/2024 181   10/27/2023 198   05/01/2023 180   12/17/2021 200 (H)   08/03/2020 179     CHOLESTEROL, TOTAL (mg/dL)   Date Value   01/03/2017 199   09/14/2011 189     CHOLESTEROL (mg/dL)   Date Value   07/02/2014 213 (H)   07/17/2013 187   07/06/2012 198     HDL Cholesterol (mg/dL)   Date Value   06/19/2024 52   10/27/2023 57   05/01/2023 69 (H)   12/17/2021 56   08/03/2020 56     HDL CHOLESTEROL (mg/dL)   Date Value   01/03/2017 59   09/14/2011 78     HDL CHOL (mg/dL)   Date Value   07/02/2014 62   07/17/2013 65   07/06/2012 80     LDL Cholesterol (mg/dL)   Date Value   06/19/2024 116 (H)   10/27/2023 123   05/01/2023 93   12/17/2021 126   08/03/2020 104 (H)     LDL-CHOLESTEROL (mg/dL (calc))   Date Value   01/03/2017 115   09/14/2011 95     LDL CHOLESTROL (mg/dL)   Date Value   07/02/2014 132 (H)   07/17/2013 105   07/06/2012 101 (H)     Triglycerides (mg/dL)   Date Value   10/27/2023 84     AST   Date Value   09/03/2024 38 U/L (H)   06/19/2024 38 U/L (H)   10/27/2023 36   07/03/2023 52 U/L (H)   01/03/2017 24 U/L   07/02/2014 25 U/L   07/17/2013 19 U/L   07/06/2012 12 U/L (L)   09/14/2011 14 U/L     ALT   Date Value   06/19/2024 29 U/L   10/27/2023 30   05/01/2023 42 U/L   03/23/2022 41 U/L   01/03/2017 31 U/L (H)   07/02/2014 31 U/L   07/17/2013 29 U/L   07/06/2012 14 U/L   09/14/2011 8 U/L      Mal[facility]/Cre Calc   Date Value Ref Range Status   03/06/2025 162.5 (H) <=30.0 ug/mg Final     Comment:     <30 ug/mg creatinine       Normal     ug/mg creatinine   Microalbuminuria   >300 ug/mg creatinine      Albuminuria           Current Medications[1]   Allergies[2]   Past Medical History[3]   Past Surgical History[4]   Social History: Short Social Hx on File[5]  Exercise: walking.  Diet: watches fats closely and low carb diet     REVIEW OF SYSTEMS:   Constitutional: Negative for fatigue and unexpected weight change.   HENT: Negative for dental  problem.    Eyes: Negative for visual disturbance.   Respiratory: Negative for cough and shortness of breath.    Cardiovascular: Negative for chest pain, palpitations and leg swelling.   Gastrointestinal: Negative for nausea, diarrhea and constipation.   Endocrine: Negative for polydipsia, polyphagia and polyuria.   Genitourinary: Negative for dysuria.   Musculoskeletal: Negative for joint pain.   Skin: Negative for rashes  Neurological: Negative for numbness.   Hematological:Negative for hypoglycemia   Psychiatric/Behavioral: Negative for sleep disturbance.    EXAM:   /68 (BP Location: Left arm, Patient Position: Sitting, Cuff Size: adult)   Pulse 74   Temp 97.9 °F (36.6 °C) (Temporal)   Resp 16   Ht 5' 5\" (1.651 m)   Wt 239 lb 12.8 oz (108.8 kg)   SpO2 98%   BMI 39.90 kg/m²   GENERAL: well developed, well nourished,in no apparent distress, pleasant, obese weight.  SKIN: no rashes,no suspicious lesions  HEENT: NC/AT, PERRLA, EOMs intact b/l. Ears clear b/l, throat without erythema or exudate.   NECK: supple,no adenopathy,no bruits.  No thyromegaly.   LUNGS: clear to auscultation, no W/R/R.  CARDIO: RRR without murmur  EXTREMITIES: no cyanosis, clubbing or edema  NEURO: CN II-XII intact. DTRs 2/4 B/L UE.    PSYCH:  Speech, mood and affect are appropriate      ASSESSMENT AND PLAN:   Alia Lanza is a 59 year old female who presents for a recheck of:  Encounter Diagnoses   Name Primary?    Anxiety     Dysthymia     Type 2 diabetes mellitus without complication, without long-term current use of insulin (HCC)     Chronic pain of right ankle     Eczema, unspecified type     Nausea     Pure hypercholesterolemia Yes    Essential hypertension, benign     Laboratory examination ordered as part of a routine general medical examination     Vitamin D deficiency        Orders Placed This Encounter   Procedures    CBC With Differential With Platelet    Comp Metabolic Panel (14)    Lipid Panel    Hemoglobin A1C     TSH W Reflex To Free T4    Vitamin D       Meds & Refills for this Visit:  Requested Prescriptions     Signed Prescriptions Disp Refills    ALPRAZolam 0.5 MG Oral Tab 30 tablet 0     Sig: TAKE 1 TABLET BY MOUTH 2 TIMES DAILY AS NEEDED FOR AXIETY    escitalopram 10 MG Oral Tab 90 tablet 0     Sig: Take 1 tablet (10 mg total) by mouth daily.    Glucose Blood (CONTOUR NEXT TEST) In Vitro Strip 100 each 0     Sig: Test once daily    HYDROcodone-acetaminophen (NORCO) 5-325 MG Oral Tab 30 tablet 0     Sig: Take 1 tablet by mouth every 8 (eight) hours as needed for Pain.    hydrocortisone 2.5 % External Cream 28.35 g 0     Sig: APPLY TO AFFECTED AREA OF BREAST TWICE A DAY AS NEEDED    losartan 25 MG Oral Tab 45 tablet 2     Sig: Take 1/2 tablet by mouth (12.5mg total) daily.    ondansetron (ZOFRAN) 4 mg tablet 20 tablet 0     Sig: Take 1 tablet (4 mg total) by mouth every 8 (eight) hours as needed for Nausea.    semaglutide (OZEMPIC, 2 MG/DOSE,) 8 MG/3ML Subcutaneous Solution Pen-injector 3 mL 1     Sig: Inject 2 mg into the skin once a week.       Imaging & Consults:  None  1. Anxiety  - stable, continue medication  - ALPRAZolam 0.5 MG Oral Tab; TAKE 1 TABLET BY MOUTH 2 TIMES DAILY AS NEEDED FOR AXIETY  Dispense: 30 tablet; Refill: 0  - escitalopram 10 MG Oral Tab; Take 1 tablet (10 mg total) by mouth daily.  Dispense: 90 tablet; Refill: 0    2. Dysthymia  -stable, continue medication  - escitalopram 10 MG Oral Tab; Take 1 tablet (10 mg total) by mouth daily.  Dispense: 90 tablet; Refill: 0    3. Type 2 diabetes mellitus without complication, without long-term current use of insulin (HCC)  Continue present medications. Check sugars daily or as directed.  Eat a low fat, low carb, low cholesterol diet. Call with sugars less than 70 or greater than 300.   Please see your specialists as recommended.  Keep up with regular eye exams and Check feet daily. Stay on your low salt, diabetic diet.  Stay as active as you can.    -  Glucose Blood (CONTOUR NEXT TEST) In Vitro Strip; Test once daily  Dispense: 100 each; Refill: 0  - semaglutide (OZEMPIC, 2 MG/DOSE,) 8 MG/3ML Subcutaneous Solution Pen-injector; Inject 2 mg into the skin once a week.  Dispense: 3 mL; Refill: 1    4. Chronic pain of right ankle  - stable, use for severe pain  - HYDROcodone-acetaminophen (NORCO) 5-325 MG Oral Tab; Take 1 tablet by mouth every 8 (eight) hours as needed for Pain.  Dispense: 30 tablet; Refill: 0    5. Eczema, unspecified type  -stable, use as needed.  - hydrocortisone 2.5 % External Cream; APPLY TO AFFECTED AREA OF BREAST TWICE A DAY AS NEEDED  Dispense: 28.35 g; Refill: 0    6. Nausea  Stable, Pt uses the med as needed. Primarily when getting her Ozempic injection. It has been occurring less though.  - ondansetron (ZOFRAN) 4 mg tablet; Take 1 tablet (4 mg total) by mouth every 8 (eight) hours as needed for Nausea.  Dispense: 20 tablet; Refill: 0    7. Pure hypercholesterolemia  Pt to continue their medication, increase exercise,  choose better fats,  increase fiber and avoid processed foods.      8. Essential hypertension, benign  Conservative measures dicussed. Continue medication.  Diet and exercise explained and encouraged.  Home blood pressure monitoring. Pt should measure BP’s two to three times per week. Goal blood pressure at home - < 135/85.   - losartan 25 MG Oral Tab; Take 1/2 tablet by mouth (12.5mg total) daily.  Dispense: 45 tablet; Refill: 2      The patient indicates understanding of these issues and agrees to the plan.  The patient is asked to return in September for her Px..       [1]   Current Outpatient Medications   Medication Sig Dispense Refill    ALPRAZolam 0.5 MG Oral Tab TAKE 1 TABLET BY MOUTH 2 TIMES DAILY AS NEEDED FOR AXIETY 30 tablet 0    escitalopram 10 MG Oral Tab Take 1 tablet (10 mg total) by mouth daily. 90 tablet 0    Glucose Blood (CONTOUR NEXT TEST) In Vitro Strip Test once daily 100 each 0     HYDROcodone-acetaminophen (NORCO) 5-325 MG Oral Tab Take 1 tablet by mouth every 8 (eight) hours as needed for Pain. 30 tablet 0    hydrocortisone 2.5 % External Cream APPLY TO AFFECTED AREA OF BREAST TWICE A DAY AS NEEDED 28.35 g 0    losartan 25 MG Oral Tab Take 1/2 tablet by mouth (12.5mg total) daily. 45 tablet 2    ondansetron (ZOFRAN) 4 mg tablet Take 1 tablet (4 mg total) by mouth every 8 (eight) hours as needed for Nausea. 20 tablet 0    semaglutide (OZEMPIC, 2 MG/DOSE,) 8 MG/3ML Subcutaneous Solution Pen-injector Inject 2 mg into the skin once a week. 3 mL 1    METOPROLOL TARTRATE 50 MG Oral Tab TAKE 1 TABLET BY MOUTH TWICE A  tablet 1    Accu-Chek Softclix Lancets Does not apply Misc 1 LANCET BY FINGER STICK ROUTE DAILY. USE AS DIRECTED. 100 each 2    Blood Glucose Monitoring Suppl (CONTOUR NEXT MONITOR) w/Device Does not apply Kit 1 each daily. 1 kit 0    atorvastatin 20 MG Oral Tab TAKE 1 TABLET BY MOUTH EVERY DAY AT NIGHT 90 tablet 1    omega-3 fatty acids 1000 MG Oral Cap Fish Oil 1000 MG Oral Capsule QTY: 0 capsule Days: 0 Refills: 0  Written: 11/02/22 Patient Instructions:      Lutein 6 MG Oral Cap Take 6 mg by mouth daily.      ferrous sulfate 325 (65 FE) MG Oral Tab EC Take 1 tablet (325 mg total) by mouth daily with breakfast. 90 tablet 0    Cholecalciferol (VITAMIN D) 1000 units Oral Tab Take 1 tablet by mouth daily.      diphenhydrAMINE 25 MG Oral Cap Take 1 capsule (25 mg total) by mouth every 6 (six) hours as needed for Sleep.     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    Hemorrhoids    High cholesterol    Hereditary    History of depression    Post partum    Human papilloma  virus infection    Lipid screening    KIM (obstructive sleep apnea)    AHI 14.3 O2 Lm 81% Supine AHI 16.6 Non-supine AHI 12.5     Other and unspecified hyperlipidemia    Pain in joints    Ankle injury    Stress    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    Unspecified essential hypertension    Wears glasses   [4]   Past Surgical History:  Procedure Laterality Date    Back surgery  11/2007    Colonoscopy  9/21/2009    repeat 5 yrs    Colonoscopy  2011    Colposcopy, cervix w/upper adjacent vagina; w/biopsy(s), cervix  07/14/2020    Foot/toes surgery proc unlisted      rt heel    Leg/ankle surgery proc unlisted  2006,2010    x2 rt ankle    Leg/ankle surgery proc unlisted  2002    surgical repair lt leg-has hardware d/t fx    Lamar localization wire 1 site right (cpt=19281)      Other surgical history  10/2007    spinal discectomy lumbar-L5 S1    Other surgical history      right lumpectomy    Other surgical history  11/2013    R foot sx/fusion at [facility]     Tonsillectomy  1970   [5]   Social History  Socioeconomic History    Marital status:    Tobacco Use    Smoking status: Never    Smokeless tobacco: Never   Vaping Use    Vaping status: Never Used   Substance and Sexual Activity    Alcohol use: No    Drug use: No    Sexual activity: Yes     Partners: Male   Other Topics Concern    Caffeine Concern No    Exercise No

## 2025-07-10 DIAGNOSIS — E78.00 PURE HYPERCHOLESTEROLEMIA: ICD-10-CM

## 2025-07-11 RX ORDER — ATORVASTATIN CALCIUM 20 MG/1
20 TABLET, FILM COATED ORAL NIGHTLY
Qty: 90 TABLET | Refills: 1 | Status: SHIPPED | OUTPATIENT
Start: 2025-07-11

## 2025-07-11 NOTE — TELEPHONE ENCOUNTER
Requesting    Name from pharmacy: ATORVASTATIN 20 MG TABLET         Will file in chart as: ATORVASTATIN 20 MG Oral Tab     Possible duplicate: Wisam to review recent actions on this medication    Sig: TAKE 1 TABLET BY MOUTH EVERY DAY AT NIGHT    Disp: 90 tablet    Refills: 1 (Pharmacy requested: Not specified)    Start: 7/10/2025    Class: Normal    For: Pure hypercholesterolemia    To pharmacy: PLEASE SEND NEW RX FOR ATORVASTATIN    Last ordered: 9 months ago (9/18/2024) by TEJ Starr    Last refill: 4/8/2025    Rx #: [id number]    Cholesterol Medication Protocol Rsllxj27/10/2025 05:40 PM   Protocol Details ALT < 80    ALT resulted within past year    Lipid panel within past 12 months    In person appointment or virtual visit in the past 12 mos or appointment in next 3 mos    Medication is active on med list        LOV: 7/8/2025  RTC: September 2025   Last Relevant Labs: 6/19/2024  Filled: 4/8/2025 #90 with 0 refills    No future appointments.

## 2025-07-14 ENCOUNTER — TELEPHONE (OUTPATIENT)
Dept: INTERNAL MEDICINE CLINIC | Facility: CLINIC | Age: 60
End: 2025-07-14

## 2025-07-14 NOTE — TELEPHONE ENCOUNTER
Alternative request form for contour next test strip deceived from [facility]     Pharmacy comments : alternative requested: accu-check products preferred please send for a meter, strips and lancets    Please advise

## 2025-07-15 RX ORDER — BLOOD SUGAR DIAGNOSTIC
STRIP MISCELLANEOUS
Qty: 100 EACH | Refills: 0 | Status: SHIPPED | OUTPATIENT
Start: 2025-07-15

## 2025-07-15 RX ORDER — LANCETS
1 EACH MISCELLANEOUS DAILY
Qty: 100 EACH | Refills: 0 | Status: SHIPPED | OUTPATIENT
Start: 2025-07-15 | End: 2026-07-15

## 2025-07-15 RX ORDER — BLOOD-GLUCOSE METER
EACH MISCELLANEOUS
Qty: 1 KIT | Refills: 0 | Status: SHIPPED | OUTPATIENT
Start: 2025-07-15

## 2025-08-30 DIAGNOSIS — E11.9 TYPE 2 DIABETES MELLITUS WITHOUT COMPLICATION, WITHOUT LONG-TERM CURRENT USE OF INSULIN (HCC): ICD-10-CM

## 2025-08-30 RX ORDER — SEMAGLUTIDE 2.68 MG/ML
2 INJECTION, SOLUTION SUBCUTANEOUS WEEKLY
Qty: 3 ML | Refills: 1 | Status: SHIPPED | OUTPATIENT
Start: 2025-08-30

## (undated) DIAGNOSIS — E78.00 PURE HYPERCHOLESTEROLEMIA: ICD-10-CM

## (undated) NOTE — MR AVS SNAPSHOT
[place]  [address]  [address] [facility] [id number] [phone]               Thank you for choosing us for your health care visit with Any Plascencia MD.  We are glad to serve you and happy to provide you with this s Amoxicillin-Pot Clavulanate 875-125 MG Tabs   Take 1 tablet by mouth 2 (two) times daily.    Commonly known as:  AUGMENTIN           Atorvastatin Calcium 10 MG Tabs   TAKE 1 TABLET BY MOUTH EVERY NIGHT AT BEDTIME   Commonly known as:  LIPITOR           esc office, you can view your past visit information in [employer] by going to Visits < Visit Summaries. [employer] questions? Call [phone] for help. [employer] is NOT to be used for urgent needs. For medical emergencies, dial 911.            Visit [facility]

## (undated) NOTE — LETTER
10/07/21        [address]  [address] [facility] [id number]      Dear Christelle Del Valle,    [address] [facility] records indicate that you have outstanding lab work and or testing that was ordered for you and has not yet been completed:  Orders Placed This Encounte

## (undated) NOTE — LETTER
11/20/19        [address] [id number]      Dear Krys Elias,    [address] [facility] records indicate that you have outstanding lab work and or testing that was ordered for you and has not yet been completed: Fasting lab work   *fast for

## (undated) NOTE — LETTER
03/20/18        [facility] [address] [place] [id number]      Dear Crista Huizar,    [address] [facility] records indicate that you have outstanding lab work and or testing that was ordered for you and has not yet been completed:          Comp Metabolic Pane

## (undated) NOTE — LETTER
11/11/19        [address] [facility] [id number]      Dear Jessie Eaton,    [address] [facility] records indicate that you have outstanding lab work and or testing that was ordered for you and has not yet been completed:  Orders Placed This Encounte

## (undated) NOTE — LETTER
09/27/18        [address]  [address] [facility] [address]      Dear [facility] Morning,    [address] [facility] records indicate that you have outstanding lab work and or testing that was ordered for you and has not yet been completed:  Orders Placed This Encounte

## (undated) NOTE — LETTER
Alia Lanza, :1965    CONSENT FOR PROCEDURE/SEDATION    1. I authorize the performance upon Alia Lanza  the following: Endometrial biopsy procedure    2. I authorize Dr. Johan Locke MD (and whomever is designated as the doctor’s assistant), to perform the above-mentioned procedures.    3. If any unforeseen conditions arise during this procedure calling for additional  procedures, operations, or medications (including anesthesia and blood transfusion), I further request and authorize the doctor to do whatever he/she deems advisable in my interest.    4. I consent to the taking and reproduction of any photographs in the course of this procedure for professional purposes.    5. I consent to the administration of such sedation as may be considered necessary or advisable by the physician responsible for this service, with the exception of ______________________________________________________    6. I have been informed by my doctor of the nature and purpose of this procedure sedation, possible alternative methods of treatment, risk involved and possible complications.    7. If I have a Do Not Resuscitate (DNR) order in place, the physician and I (or the individual authorized to consent on my behalf) will discuss and agree as to whether the Do Not Resuscitate (DNR) order will remain in effect or will be discontinued during the performance of the procedure and the applicable recovery period. If the Do Not Resuscitate (DNR) order is discontinued and is to be reinstated following the procedure/recovery period, the physician will determine when the applicable recovery period ends for purposes of reinstating the Do Not Resuscitate (DNR) order.    Signature of Patient:_______________________________________________    Signature of person authorized to consent for patient:  _______________________________________________________________    Relationship to patient:  ____________________________________________    Witness: _________________________________________ Date:___________     Physician Signature: _______________________________ Date:___________